# Patient Record
Sex: FEMALE | Race: ASIAN | NOT HISPANIC OR LATINO
[De-identification: names, ages, dates, MRNs, and addresses within clinical notes are randomized per-mention and may not be internally consistent; named-entity substitution may affect disease eponyms.]

---

## 2024-06-06 ENCOUNTER — APPOINTMENT (OUTPATIENT)
Dept: OBGYN | Facility: CLINIC | Age: 34
End: 2024-06-06

## 2024-06-06 PROBLEM — Z00.00 ENCOUNTER FOR PREVENTIVE HEALTH EXAMINATION: Status: ACTIVE | Noted: 2024-06-06

## 2024-06-10 ENCOUNTER — APPOINTMENT (OUTPATIENT)
Dept: OBGYN | Facility: CLINIC | Age: 34
End: 2024-06-10

## 2024-06-13 ENCOUNTER — LABORATORY RESULT (OUTPATIENT)
Age: 34
End: 2024-06-13

## 2024-06-13 ENCOUNTER — APPOINTMENT (OUTPATIENT)
Dept: OBGYN | Facility: CLINIC | Age: 34
End: 2024-06-13

## 2024-06-13 ENCOUNTER — NON-APPOINTMENT (OUTPATIENT)
Age: 34
End: 2024-06-13

## 2024-06-13 ENCOUNTER — OUTPATIENT (OUTPATIENT)
Dept: OUTPATIENT SERVICES | Facility: HOSPITAL | Age: 34
LOS: 1 days | End: 2024-06-13

## 2024-06-13 ENCOUNTER — OUTPATIENT (OUTPATIENT)
Dept: OUTPATIENT SERVICES | Facility: HOSPITAL | Age: 34
LOS: 1 days | End: 2024-06-13
Payer: MEDICAID

## 2024-06-13 ENCOUNTER — APPOINTMENT (OUTPATIENT)
Dept: OBGYN | Facility: CLINIC | Age: 34
End: 2024-06-13
Payer: MEDICAID

## 2024-06-13 VITALS
SYSTOLIC BLOOD PRESSURE: 125 MMHG | WEIGHT: 184.44 LBS | HEIGHT: 64 IN | BODY MASS INDEX: 31.49 KG/M2 | DIASTOLIC BLOOD PRESSURE: 77 MMHG

## 2024-06-13 DIAGNOSIS — Z87.42 PERSONAL HISTORY OF OTHER DISEASES OF THE FEMALE GENITAL TRACT: ICD-10-CM

## 2024-06-13 DIAGNOSIS — Z31.83 ENCOUNTER FOR ASSISTED REPRODUCTIVE FERTILITY PROCEDURE CYCLE: ICD-10-CM

## 2024-06-13 DIAGNOSIS — O09.519 SUPERVISION OF ELDERLY PRIMIGRAVIDA, UNSPECIFIED TRIMESTER: ICD-10-CM

## 2024-06-13 LAB
ABO + RH PNL BLD: NORMAL
BASOPHILS # BLD AUTO: 0.05 K/UL
BASOPHILS NFR BLD AUTO: 0.5 %
BILIRUB UR QL STRIP: NORMAL
BLD GP AB SCN SERPL QL: NORMAL
CLARITY UR: CLEAR
COLLECTION METHOD: NORMAL
EOSINOPHIL # BLD AUTO: 0.11 K/UL
EOSINOPHIL NFR BLD AUTO: 1.1 %
ESTIMATED AVERAGE GLUCOSE: 111 MG/DL
GLUCOSE UR-MCNC: NORMAL
HBA1C MFR BLD HPLC: 5.5 %
HCG UR QL: 0.2 EU/DL
HCT VFR BLD CALC: 34.9 %
HGB BLD-MCNC: 12 G/DL
HGB UR QL STRIP.AUTO: NORMAL
IMM GRANULOCYTES NFR BLD AUTO: 0.8 %
KETONES UR-MCNC: NORMAL
LEUKOCYTE ESTERASE UR QL STRIP: NORMAL
LYMPHOCYTES # BLD AUTO: 2.44 K/UL
LYMPHOCYTES NFR BLD AUTO: 24.6 %
MAN DIFF?: NORMAL
MCHC RBC-ENTMCNC: 27.8 PG
MCHC RBC-ENTMCNC: 34.4 G/DL
MCV RBC AUTO: 81 FL
MONOCYTES # BLD AUTO: 0.49 K/UL
MONOCYTES NFR BLD AUTO: 4.9 %
NEUTROPHILS # BLD AUTO: 6.73 K/UL
NEUTROPHILS NFR BLD AUTO: 68.1 %
NITRITE UR QL STRIP: NORMAL
PH UR STRIP: 6
PLATELET # BLD AUTO: 276 K/UL
PMV BLD AUTO: 0 /100 WBCS
PROT UR STRIP-MCNC: NORMAL
RBC # BLD: 4.31 M/UL
RBC # FLD: 13.7 %
SP GR UR STRIP: 1.02
WBC # FLD AUTO: 9.9 K/UL

## 2024-06-13 PROCEDURE — 83020 HEMOGLOBIN ELECTROPHORESIS: CPT | Mod: 26

## 2024-06-13 PROCEDURE — 99204 OFFICE O/P NEW MOD 45 MIN: CPT

## 2024-06-14 ENCOUNTER — NON-APPOINTMENT (OUTPATIENT)
Age: 34
End: 2024-06-14

## 2024-06-14 DIAGNOSIS — Z31.83 ENCOUNTER FOR ASSISTED REPRODUCTIVE FERTILITY PROCEDURE CYCLE: ICD-10-CM

## 2024-06-14 LAB
C TRACH RRNA SPEC QL NAA+PROBE: NOT DETECTED
HBV SURFACE AG SER QL: NONREACTIVE
HCV AB SER QL: NONREACTIVE
HCV S/CO RATIO: 0.11 S/CO
HGB A MFR BLD: 97.7 %
HGB A2 MFR BLD: 2.3 %
HGB FRACT BLD-IMP: NORMAL
HIV1+2 AB SPEC QL IA.RAPID: NONREACTIVE
MEV IGG FLD QL IA: 167 AU/ML
MEV IGG+IGM SER-IMP: POSITIVE
N GONORRHOEA RRNA SPEC QL NAA+PROBE: NOT DETECTED
RUBV IGG FLD-ACNC: 8.07 INDEX
RUBV IGG SER-IMP: POSITIVE
SOURCE AMPLIFICATION: NORMAL

## 2024-06-15 LAB
BACTERIA UR CULT: ABNORMAL
LEAD BLD-MCNC: <1 UG/DL
M TB IFN-G BLD-IMP: NEGATIVE
QUANTIFERON TB PLUS MITOGEN MINUS NIL: 1.44 IU/ML
QUANTIFERON TB PLUS NIL: 0.02 IU/ML
QUANTIFERON TB PLUS TB1 MINUS NIL: 0 IU/ML
QUANTIFERON TB PLUS TB2 MINUS NIL: 0.01 IU/ML
SOURCE AMPLIFICATION: NORMAL
T VAGINALIS RRNA SPEC QL NAA+PROBE: NOT DETECTED

## 2024-06-17 ENCOUNTER — ASOB RESULT (OUTPATIENT)
Age: 34
End: 2024-06-17

## 2024-06-17 ENCOUNTER — APPOINTMENT (OUTPATIENT)
Dept: ANTEPARTUM | Facility: CLINIC | Age: 34
End: 2024-06-17

## 2024-06-17 ENCOUNTER — OUTPATIENT (OUTPATIENT)
Dept: OUTPATIENT SERVICES | Facility: HOSPITAL | Age: 34
LOS: 1 days | End: 2024-06-17
Payer: MEDICAID

## 2024-06-17 ENCOUNTER — APPOINTMENT (OUTPATIENT)
Dept: ANTEPARTUM | Facility: CLINIC | Age: 34
End: 2024-06-17
Payer: MEDICAID

## 2024-06-17 DIAGNOSIS — Z3A.34 34 WEEKS GESTATION OF PREGNANCY: ICD-10-CM

## 2024-06-17 DIAGNOSIS — Z36.3 ENCOUNTER FOR ANTENATAL SCREENING FOR MALFORMATIONS: ICD-10-CM

## 2024-06-17 DIAGNOSIS — O26.842 UTERINE SIZE-DATE DISCREPANCY, SECOND TRIMESTER: ICD-10-CM

## 2024-06-17 DIAGNOSIS — Z34.90 ENCOUNTER FOR SUPERVISION OF NORMAL PREGNANCY, UNSPECIFIED, UNSPECIFIED TRIMESTER: ICD-10-CM

## 2024-06-17 DIAGNOSIS — O34.13 MATERNAL CARE FOR BENIGN TUMOR OF CORPUS UTERI, THIRD TRIMESTER: ICD-10-CM

## 2024-06-17 DIAGNOSIS — O09.813 SUPERVISION OF PREGNANCY RESULTING FROM ASSISTED REPRODUCTIVE TECHNOLOGY, THIRD TRIMESTER: ICD-10-CM

## 2024-06-17 DIAGNOSIS — O09.33 SUPERVISION OF PREGNANCY WITH INSUFFICIENT ANTENATAL CARE, THIRD TRIMESTER: ICD-10-CM

## 2024-06-17 PROCEDURE — 76805 OB US >/= 14 WKS SNGL FETUS: CPT | Mod: 26

## 2024-06-17 PROCEDURE — 76819 FETAL BIOPHYS PROFIL W/O NST: CPT | Mod: 26

## 2024-06-17 PROCEDURE — 76817 TRANSVAGINAL US OBSTETRIC: CPT

## 2024-06-17 PROCEDURE — 76817 TRANSVAGINAL US OBSTETRIC: CPT | Mod: 26

## 2024-06-17 PROCEDURE — 76819 FETAL BIOPHYS PROFIL W/O NST: CPT

## 2024-06-17 PROCEDURE — 76811 OB US DETAILED SNGL FETUS: CPT

## 2024-06-18 LAB — AR GENE MUT ANL BLD/T: NORMAL

## 2024-06-19 LAB — CFTR MUT TESTED BLD/T: NEGATIVE

## 2024-06-20 ENCOUNTER — APPOINTMENT (OUTPATIENT)
Dept: OBGYN | Facility: CLINIC | Age: 34
End: 2024-06-20
Payer: MEDICAID

## 2024-06-20 ENCOUNTER — TRANSCRIPTION ENCOUNTER (OUTPATIENT)
Age: 34
End: 2024-06-20

## 2024-06-20 ENCOUNTER — OUTPATIENT (OUTPATIENT)
Dept: OUTPATIENT SERVICES | Facility: HOSPITAL | Age: 34
LOS: 1 days | End: 2024-06-20
Payer: MEDICAID

## 2024-06-20 VITALS
WEIGHT: 182 LBS | OXYGEN SATURATION: 98 % | DIASTOLIC BLOOD PRESSURE: 78 MMHG | BODY MASS INDEX: 31.07 KG/M2 | HEIGHT: 64 IN | SYSTOLIC BLOOD PRESSURE: 121 MMHG | HEART RATE: 66 BPM

## 2024-06-20 DIAGNOSIS — Z34.93 ENCOUNTER FOR SUPERVISION OF NORMAL PREGNANCY, UNSPECIFIED, THIRD TRIMESTER: ICD-10-CM

## 2024-06-20 DIAGNOSIS — Z34.90 ENCOUNTER FOR SUPERVISION OF NORMAL PREGNANCY, UNSPECIFIED, UNSPECIFIED TRIMESTER: ICD-10-CM

## 2024-06-20 LAB
BILIRUB UR QL STRIP: NORMAL
CLARITY UR: CLEAR
COLLECTION METHOD: NORMAL
GLUCOSE UR-MCNC: NORMAL
HCG UR QL: NORMAL EU/DL
HGB UR QL STRIP.AUTO: NORMAL
KETONES UR-MCNC: NORMAL
LEUKOCYTE ESTERASE UR QL STRIP: NORMAL
NITRITE UR QL STRIP: NORMAL
PH UR STRIP: NORMAL
PROT UR STRIP-MCNC: NORMAL
SP GR UR STRIP: NORMAL

## 2024-06-20 PROCEDURE — 99213 OFFICE O/P EST LOW 20 MIN: CPT

## 2024-06-20 PROCEDURE — 81002 URINALYSIS NONAUTO W/O SCOPE: CPT

## 2024-06-20 PROCEDURE — 99203 OFFICE O/P NEW LOW 30 MIN: CPT

## 2024-06-21 ENCOUNTER — APPOINTMENT (OUTPATIENT)
Dept: OBGYN | Facility: CLINIC | Age: 34
End: 2024-06-21

## 2024-06-21 ENCOUNTER — NON-APPOINTMENT (OUTPATIENT)
Age: 34
End: 2024-06-21

## 2024-06-21 DIAGNOSIS — Z34.93 ENCOUNTER FOR SUPERVISION OF NORMAL PREGNANCY, UNSPECIFIED, THIRD TRIMESTER: ICD-10-CM

## 2024-06-21 DIAGNOSIS — Z00.00 ENCOUNTER FOR GENERAL ADULT MEDICAL EXAMINATION WITHOUT ABNORMAL FINDINGS: ICD-10-CM

## 2024-06-21 LAB — FMR1 GENE MUT ANL BLD/T: NORMAL

## 2024-06-24 ENCOUNTER — ASOB RESULT (OUTPATIENT)
Age: 34
End: 2024-06-24

## 2024-06-24 ENCOUNTER — OUTPATIENT (OUTPATIENT)
Dept: OUTPATIENT SERVICES | Facility: HOSPITAL | Age: 34
LOS: 1 days | End: 2024-06-24
Payer: MEDICAID

## 2024-06-24 ENCOUNTER — APPOINTMENT (OUTPATIENT)
Dept: ANTEPARTUM | Facility: CLINIC | Age: 34
End: 2024-06-24
Payer: MEDICAID

## 2024-06-24 DIAGNOSIS — Z34.90 ENCOUNTER FOR SUPERVISION OF NORMAL PREGNANCY, UNSPECIFIED, UNSPECIFIED TRIMESTER: ICD-10-CM

## 2024-06-24 PROCEDURE — 76819 FETAL BIOPHYS PROFIL W/O NST: CPT | Mod: 26

## 2024-06-24 PROCEDURE — 76819 FETAL BIOPHYS PROFIL W/O NST: CPT

## 2024-06-25 DIAGNOSIS — O34.13 MATERNAL CARE FOR BENIGN TUMOR OF CORPUS UTERI, THIRD TRIMESTER: ICD-10-CM

## 2024-06-25 DIAGNOSIS — O09.33 SUPERVISION OF PREGNANCY WITH INSUFFICIENT ANTENATAL CARE, THIRD TRIMESTER: ICD-10-CM

## 2024-06-25 DIAGNOSIS — Z3A.35 35 WEEKS GESTATION OF PREGNANCY: ICD-10-CM

## 2024-07-01 ENCOUNTER — APPOINTMENT (OUTPATIENT)
Dept: ANTEPARTUM | Facility: CLINIC | Age: 34
End: 2024-07-01
Payer: MEDICAID

## 2024-07-01 ENCOUNTER — ASOB RESULT (OUTPATIENT)
Age: 34
End: 2024-07-01

## 2024-07-01 ENCOUNTER — OUTPATIENT (OUTPATIENT)
Dept: OUTPATIENT SERVICES | Facility: HOSPITAL | Age: 34
LOS: 1 days | End: 2024-07-01
Payer: MEDICAID

## 2024-07-01 DIAGNOSIS — Z34.90 ENCOUNTER FOR SUPERVISION OF NORMAL PREGNANCY, UNSPECIFIED, UNSPECIFIED TRIMESTER: ICD-10-CM

## 2024-07-01 PROCEDURE — 76819 FETAL BIOPHYS PROFIL W/O NST: CPT | Mod: 26

## 2024-07-01 PROCEDURE — 76819 FETAL BIOPHYS PROFIL W/O NST: CPT

## 2024-07-02 DIAGNOSIS — O09.813 SUPERVISION OF PREGNANCY RESULTING FROM ASSISTED REPRODUCTIVE TECHNOLOGY, THIRD TRIMESTER: ICD-10-CM

## 2024-07-02 DIAGNOSIS — Z3A.36 36 WEEKS GESTATION OF PREGNANCY: ICD-10-CM

## 2024-07-02 DIAGNOSIS — O34.13 MATERNAL CARE FOR BENIGN TUMOR OF CORPUS UTERI, THIRD TRIMESTER: ICD-10-CM

## 2024-07-02 DIAGNOSIS — O09.33 SUPERVISION OF PREGNANCY WITH INSUFFICIENT ANTENATAL CARE, THIRD TRIMESTER: ICD-10-CM

## 2024-07-03 ENCOUNTER — OUTPATIENT (OUTPATIENT)
Dept: INPATIENT UNIT | Facility: HOSPITAL | Age: 34
LOS: 1 days | Discharge: HOME | End: 2024-07-03
Payer: MEDICAID

## 2024-07-03 ENCOUNTER — APPOINTMENT (OUTPATIENT)
Dept: OBGYN | Facility: CLINIC | Age: 34
End: 2024-07-03
Payer: MEDICAID

## 2024-07-03 ENCOUNTER — OUTPATIENT (OUTPATIENT)
Dept: OUTPATIENT SERVICES | Facility: HOSPITAL | Age: 34
LOS: 1 days | End: 2024-07-03
Payer: MEDICAID

## 2024-07-03 VITALS
DIASTOLIC BLOOD PRESSURE: 56 MMHG | TEMPERATURE: 98 F | SYSTOLIC BLOOD PRESSURE: 120 MMHG | RESPIRATION RATE: 16 BRPM | HEART RATE: 57 BPM

## 2024-07-03 VITALS — DIASTOLIC BLOOD PRESSURE: 56 MMHG | SYSTOLIC BLOOD PRESSURE: 120 MMHG | HEART RATE: 57 BPM

## 2024-07-03 VITALS — SYSTOLIC BLOOD PRESSURE: 123 MMHG | DIASTOLIC BLOOD PRESSURE: 87 MMHG | WEIGHT: 189 LBS | BODY MASS INDEX: 32.44 KG/M2

## 2024-07-03 DIAGNOSIS — Z98.890 OTHER SPECIFIED POSTPROCEDURAL STATES: Chronic | ICD-10-CM

## 2024-07-03 DIAGNOSIS — Z98.891 HISTORY OF UTERINE SCAR FROM PREVIOUS SURGERY: Chronic | ICD-10-CM

## 2024-07-03 DIAGNOSIS — Z34.90 ENCOUNTER FOR SUPERVISION OF NORMAL PREGNANCY, UNSPECIFIED, UNSPECIFIED TRIMESTER: ICD-10-CM

## 2024-07-03 DIAGNOSIS — O26.899 OTHER SPECIFIED PREGNANCY RELATED CONDITIONS, UNSPECIFIED TRIMESTER: ICD-10-CM

## 2024-07-03 PROCEDURE — 87491 CHLMYD TRACH DNA AMP PROBE: CPT

## 2024-07-03 PROCEDURE — 87653 STREP B DNA AMP PROBE: CPT

## 2024-07-03 PROCEDURE — 99214 OFFICE O/P EST MOD 30 MIN: CPT

## 2024-07-03 PROCEDURE — 81002 URINALYSIS NONAUTO W/O SCOPE: CPT

## 2024-07-03 PROCEDURE — 87661 TRICHOMONAS VAGINALIS AMPLIF: CPT

## 2024-07-03 PROCEDURE — 99213 OFFICE O/P EST LOW 20 MIN: CPT

## 2024-07-03 PROCEDURE — 87591 N.GONORRHOEAE DNA AMP PROB: CPT

## 2024-07-05 DIAGNOSIS — O09.813 SUPERVISION OF PREGNANCY RESULTING FROM ASSISTED REPRODUCTIVE TECHNOLOGY, THIRD TRIMESTER: ICD-10-CM

## 2024-07-05 DIAGNOSIS — O99.891 OTHER SPECIFIED DISEASES AND CONDITIONS COMPLICATING PREGNANCY: ICD-10-CM

## 2024-07-05 DIAGNOSIS — O36.8130 DECREASED FETAL MOVEMENTS, THIRD TRIMESTER, NOT APPLICABLE OR UNSPECIFIED: ICD-10-CM

## 2024-07-05 DIAGNOSIS — D21.9 BENIGN NEOPLASM OF CONNECTIVE AND OTHER SOFT TISSUE, UNSPECIFIED: ICD-10-CM

## 2024-07-05 DIAGNOSIS — Z3A.36 36 WEEKS GESTATION OF PREGNANCY: ICD-10-CM

## 2024-07-05 DIAGNOSIS — Z34.90 ENCOUNTER FOR SUPERVISION OF NORMAL PREGNANCY, UNSPECIFIED, UNSPECIFIED TRIMESTER: ICD-10-CM

## 2024-07-05 LAB
SOURCE AMPLIFICATION: NORMAL
T VAGINALIS RRNA SPEC QL NAA+PROBE: NOT DETECTED

## 2024-07-06 LAB
C TRACH RRNA SPEC QL NAA+PROBE: NOT DETECTED
GP B STREP DNA SPEC QL NAA+PROBE: NOT DETECTED
N GONORRHOEA RRNA SPEC QL NAA+PROBE: NOT DETECTED
SOURCE AMPLIFICATION: NORMAL
SOURCE GBS: NORMAL

## 2024-07-08 ENCOUNTER — APPOINTMENT (OUTPATIENT)
Dept: ANTEPARTUM | Facility: CLINIC | Age: 34
End: 2024-07-08
Payer: MEDICAID

## 2024-07-08 ENCOUNTER — ASOB RESULT (OUTPATIENT)
Age: 34
End: 2024-07-08

## 2024-07-08 ENCOUNTER — OUTPATIENT (OUTPATIENT)
Dept: OUTPATIENT SERVICES | Facility: HOSPITAL | Age: 34
LOS: 1 days | End: 2024-07-08
Payer: MEDICAID

## 2024-07-08 DIAGNOSIS — Z98.891 HISTORY OF UTERINE SCAR FROM PREVIOUS SURGERY: Chronic | ICD-10-CM

## 2024-07-08 DIAGNOSIS — Z34.90 ENCOUNTER FOR SUPERVISION OF NORMAL PREGNANCY, UNSPECIFIED, UNSPECIFIED TRIMESTER: ICD-10-CM

## 2024-07-08 DIAGNOSIS — Z98.890 OTHER SPECIFIED POSTPROCEDURAL STATES: Chronic | ICD-10-CM

## 2024-07-08 PROCEDURE — 76816 OB US FOLLOW-UP PER FETUS: CPT | Mod: 26

## 2024-07-08 PROCEDURE — 76816 OB US FOLLOW-UP PER FETUS: CPT

## 2024-07-08 PROCEDURE — 76819 FETAL BIOPHYS PROFIL W/O NST: CPT | Mod: 26

## 2024-07-08 PROCEDURE — 76819 FETAL BIOPHYS PROFIL W/O NST: CPT

## 2024-07-10 ENCOUNTER — APPOINTMENT (OUTPATIENT)
Dept: OBGYN | Facility: CLINIC | Age: 34
End: 2024-07-10
Payer: MEDICAID

## 2024-07-10 ENCOUNTER — OUTPATIENT (OUTPATIENT)
Dept: OUTPATIENT SERVICES | Facility: HOSPITAL | Age: 34
LOS: 1 days | End: 2024-07-10
Payer: MEDICAID

## 2024-07-10 VITALS
SYSTOLIC BLOOD PRESSURE: 123 MMHG | HEIGHT: 64 IN | BODY MASS INDEX: 32.78 KG/M2 | WEIGHT: 192 LBS | DIASTOLIC BLOOD PRESSURE: 84 MMHG

## 2024-07-10 DIAGNOSIS — Z98.890 OTHER SPECIFIED POSTPROCEDURAL STATES: Chronic | ICD-10-CM

## 2024-07-10 DIAGNOSIS — Z34.90 ENCOUNTER FOR SUPERVISION OF NORMAL PREGNANCY, UNSPECIFIED, UNSPECIFIED TRIMESTER: ICD-10-CM

## 2024-07-10 DIAGNOSIS — Z3A.37 37 WEEKS GESTATION OF PREGNANCY: ICD-10-CM

## 2024-07-10 DIAGNOSIS — O34.13 MATERNAL CARE FOR BENIGN TUMOR OF CORPUS UTERI, THIRD TRIMESTER: ICD-10-CM

## 2024-07-10 DIAGNOSIS — Z98.891 HISTORY OF UTERINE SCAR FROM PREVIOUS SURGERY: Chronic | ICD-10-CM

## 2024-07-10 DIAGNOSIS — O09.33 SUPERVISION OF PREGNANCY WITH INSUFFICIENT ANTENATAL CARE, THIRD TRIMESTER: ICD-10-CM

## 2024-07-10 DIAGNOSIS — O09.813 SUPERVISION OF PREGNANCY RESULTING FROM ASSISTED REPRODUCTIVE TECHNOLOGY, THIRD TRIMESTER: ICD-10-CM

## 2024-07-10 LAB
BILIRUB UR QL STRIP: NEGATIVE
CLARITY UR: CLEAR
COLLECTION METHOD: NORMAL
GLUCOSE UR-MCNC: NEGATIVE
HCG UR QL: 0.2 EU/DL
HGB UR QL STRIP.AUTO: NEGATIVE
KETONES UR-MCNC: NEGATIVE
LEUKOCYTE ESTERASE UR QL STRIP: NEGATIVE
NITRITE UR QL STRIP: NEGATIVE
PH UR STRIP: 6
PROT UR STRIP-MCNC: NEGATIVE
SP GR UR STRIP: 1

## 2024-07-10 PROCEDURE — 99213 OFFICE O/P EST LOW 20 MIN: CPT

## 2024-07-10 PROCEDURE — 81002 URINALYSIS NONAUTO W/O SCOPE: CPT

## 2024-07-12 ENCOUNTER — INPATIENT (INPATIENT)
Facility: HOSPITAL | Age: 34
LOS: 4 days | Discharge: HOME | DRG: 560 | End: 2024-07-17
Attending: STUDENT IN AN ORGANIZED HEALTH CARE EDUCATION/TRAINING PROGRAM | Admitting: STUDENT IN AN ORGANIZED HEALTH CARE EDUCATION/TRAINING PROGRAM
Payer: MEDICAID

## 2024-07-12 VITALS — SYSTOLIC BLOOD PRESSURE: 129 MMHG | HEART RATE: 120 BPM | DIASTOLIC BLOOD PRESSURE: 92 MMHG

## 2024-07-12 DIAGNOSIS — Z98.891 HISTORY OF UTERINE SCAR FROM PREVIOUS SURGERY: Chronic | ICD-10-CM

## 2024-07-12 DIAGNOSIS — Z98.890 OTHER SPECIFIED POSTPROCEDURAL STATES: Chronic | ICD-10-CM

## 2024-07-12 DIAGNOSIS — Z34.90 ENCOUNTER FOR SUPERVISION OF NORMAL PREGNANCY, UNSPECIFIED, UNSPECIFIED TRIMESTER: ICD-10-CM

## 2024-07-12 DIAGNOSIS — O61.0 FAILED MEDICAL INDUCTION OF LABOR: ICD-10-CM

## 2024-07-12 DIAGNOSIS — K51.90 ULCERATIVE COLITIS, UNSPECIFIED, WITHOUT COMPLICATIONS: ICD-10-CM

## 2024-07-12 DIAGNOSIS — O26.899 OTHER SPECIFIED PREGNANCY RELATED CONDITIONS, UNSPECIFIED TRIMESTER: ICD-10-CM

## 2024-07-12 LAB
ALBUMIN SERPL ELPH-MCNC: 3.4 G/DL — LOW (ref 3.5–5.2)
ALP SERPL-CCNC: 153 U/L — HIGH (ref 30–115)
ALT FLD-CCNC: 10 U/L — SIGNIFICANT CHANGE UP (ref 0–41)
AMPHET UR-MCNC: NEGATIVE — SIGNIFICANT CHANGE UP
ANION GAP SERPL CALC-SCNC: 11 MMOL/L — SIGNIFICANT CHANGE UP (ref 7–14)
APPEARANCE UR: CLEAR — SIGNIFICANT CHANGE UP
AST SERPL-CCNC: 16 U/L — SIGNIFICANT CHANGE UP (ref 0–41)
BARBITURATES UR SCN-MCNC: NEGATIVE — SIGNIFICANT CHANGE UP
BASOPHILS # BLD AUTO: 0.05 K/UL — SIGNIFICANT CHANGE UP (ref 0–0.2)
BASOPHILS NFR BLD AUTO: 0.4 % — SIGNIFICANT CHANGE UP (ref 0–1)
BENZODIAZ UR-MCNC: NEGATIVE — SIGNIFICANT CHANGE UP
BILIRUB SERPL-MCNC: <0.2 MG/DL — SIGNIFICANT CHANGE UP (ref 0.2–1.2)
BILIRUB UR-MCNC: NEGATIVE — SIGNIFICANT CHANGE UP
BLD GP AB SCN SERPL QL: SIGNIFICANT CHANGE UP
BUN SERPL-MCNC: 12 MG/DL — SIGNIFICANT CHANGE UP (ref 10–20)
BUPRENORPHINE SCREEN, URINE RESULT: NEGATIVE — SIGNIFICANT CHANGE UP
CALCIUM SERPL-MCNC: 9.5 MG/DL — SIGNIFICANT CHANGE UP (ref 8.4–10.5)
CHLORIDE SERPL-SCNC: 103 MMOL/L — SIGNIFICANT CHANGE UP (ref 98–110)
CO2 SERPL-SCNC: 17 MMOL/L — SIGNIFICANT CHANGE UP (ref 17–32)
COCAINE METAB.OTHER UR-MCNC: NEGATIVE — SIGNIFICANT CHANGE UP
COLOR SPEC: YELLOW — SIGNIFICANT CHANGE UP
CREAT ?TM UR-MCNC: 49 MG/DL — SIGNIFICANT CHANGE UP
CREAT SERPL-MCNC: 0.5 MG/DL — LOW (ref 0.7–1.5)
DIFF PNL FLD: ABNORMAL
EGFR: 126 ML/MIN/1.73M2 — SIGNIFICANT CHANGE UP
EOSINOPHIL # BLD AUTO: 0.1 K/UL — SIGNIFICANT CHANGE UP (ref 0–0.7)
EOSINOPHIL NFR BLD AUTO: 0.9 % — SIGNIFICANT CHANGE UP (ref 0–8)
FENTANYL UR QL: NEGATIVE — SIGNIFICANT CHANGE UP
GLUCOSE SERPL-MCNC: 106 MG/DL — HIGH (ref 70–99)
GLUCOSE UR QL: NEGATIVE MG/DL — SIGNIFICANT CHANGE UP
HCT VFR BLD CALC: 37.6 % — SIGNIFICANT CHANGE UP (ref 37–47)
HGB BLD-MCNC: 13.1 G/DL — SIGNIFICANT CHANGE UP (ref 12–16)
IMM GRANULOCYTES NFR BLD AUTO: 0.6 % — HIGH (ref 0.1–0.3)
KETONES UR-MCNC: NEGATIVE MG/DL — SIGNIFICANT CHANGE UP
L&D DRUG SCREEN, URINE: SIGNIFICANT CHANGE UP
LDH SERPL L TO P-CCNC: 178 — SIGNIFICANT CHANGE UP (ref 50–242)
LEUKOCYTE ESTERASE UR-ACNC: ABNORMAL
LYMPHOCYTES # BLD AUTO: 2.95 K/UL — SIGNIFICANT CHANGE UP (ref 1.2–3.4)
LYMPHOCYTES # BLD AUTO: 26.4 % — SIGNIFICANT CHANGE UP (ref 20.5–51.1)
MCHC RBC-ENTMCNC: 27.8 PG — SIGNIFICANT CHANGE UP (ref 27–31)
MCHC RBC-ENTMCNC: 34.8 G/DL — SIGNIFICANT CHANGE UP (ref 32–37)
MCV RBC AUTO: 79.8 FL — LOW (ref 81–99)
METHADONE UR-MCNC: NEGATIVE — SIGNIFICANT CHANGE UP
MONOCYTES # BLD AUTO: 0.63 K/UL — HIGH (ref 0.1–0.6)
MONOCYTES NFR BLD AUTO: 5.6 % — SIGNIFICANT CHANGE UP (ref 1.7–9.3)
NEUTROPHILS # BLD AUTO: 7.37 K/UL — HIGH (ref 1.4–6.5)
NEUTROPHILS NFR BLD AUTO: 66.1 % — SIGNIFICANT CHANGE UP (ref 42.2–75.2)
NITRITE UR-MCNC: NEGATIVE — SIGNIFICANT CHANGE UP
NRBC # BLD: 0 /100 WBCS — SIGNIFICANT CHANGE UP (ref 0–0)
OPIATES UR-MCNC: NEGATIVE — SIGNIFICANT CHANGE UP
OXYCODONE UR-MCNC: NEGATIVE — SIGNIFICANT CHANGE UP
PCP UR-MCNC: NEGATIVE — SIGNIFICANT CHANGE UP
PH UR: 6.5 — SIGNIFICANT CHANGE UP (ref 5–8)
PLATELET # BLD AUTO: 267 K/UL — SIGNIFICANT CHANGE UP (ref 130–400)
PMV BLD: 10.5 FL — HIGH (ref 7.4–10.4)
POTASSIUM SERPL-MCNC: 4.1 MMOL/L — SIGNIFICANT CHANGE UP (ref 3.5–5)
POTASSIUM SERPL-SCNC: 4.1 MMOL/L — SIGNIFICANT CHANGE UP (ref 3.5–5)
PRENATAL SYPHILIS TEST: SIGNIFICANT CHANGE UP
PROPOXYPHENE QUALITATIVE URINE RESULT: NEGATIVE — SIGNIFICANT CHANGE UP
PROT ?TM UR-MCNC: 11 MG/DLG/24H — SIGNIFICANT CHANGE UP
PROT SERPL-MCNC: 6.3 G/DL — SIGNIFICANT CHANGE UP (ref 6–8)
PROT UR-MCNC: NEGATIVE MG/DL — SIGNIFICANT CHANGE UP
PROT/CREAT UR-RTO: 0.2 RATIO — SIGNIFICANT CHANGE UP (ref 0–0.2)
RAPID RVP RESULT: SIGNIFICANT CHANGE UP
RBC # BLD: 4.71 M/UL — SIGNIFICANT CHANGE UP (ref 4.2–5.4)
RBC # FLD: 14 % — SIGNIFICANT CHANGE UP (ref 11.5–14.5)
SARS-COV-2 RNA SPEC QL NAA+PROBE: SIGNIFICANT CHANGE UP
SODIUM SERPL-SCNC: 131 MMOL/L — LOW (ref 135–146)
SP GR SPEC: 1.01 — SIGNIFICANT CHANGE UP (ref 1–1.03)
URATE SERPL-MCNC: 7.3 MG/DL — HIGH (ref 2.5–7)
UROBILINOGEN FLD QL: 0.2 MG/DL — SIGNIFICANT CHANGE UP (ref 0.2–1)
WBC # BLD: 11.17 K/UL — HIGH (ref 4.8–10.8)
WBC # FLD AUTO: 11.17 K/UL — HIGH (ref 4.8–10.8)

## 2024-07-12 PROCEDURE — 82570 ASSAY OF URINE CREATININE: CPT

## 2024-07-12 PROCEDURE — 87086 URINE CULTURE/COLONY COUNT: CPT

## 2024-07-12 PROCEDURE — 36415 COLL VENOUS BLD VENIPUNCTURE: CPT

## 2024-07-12 PROCEDURE — 80354 DRUG SCREENING FENTANYL: CPT

## 2024-07-12 PROCEDURE — 80307 DRUG TEST PRSMV CHEM ANLYZR: CPT

## 2024-07-12 PROCEDURE — 87186 SC STD MICRODIL/AGAR DIL: CPT

## 2024-07-12 PROCEDURE — 86592 SYPHILIS TEST NON-TREP QUAL: CPT

## 2024-07-12 PROCEDURE — 85025 COMPLETE CBC W/AUTO DIFF WBC: CPT

## 2024-07-12 PROCEDURE — 59050 FETAL MONITOR W/REPORT: CPT

## 2024-07-12 PROCEDURE — 87077 CULTURE AEROBIC IDENTIFY: CPT

## 2024-07-12 PROCEDURE — 86901 BLOOD TYPING SEROLOGIC RH(D): CPT

## 2024-07-12 PROCEDURE — 84156 ASSAY OF PROTEIN URINE: CPT

## 2024-07-12 PROCEDURE — 86850 RBC ANTIBODY SCREEN: CPT

## 2024-07-12 PROCEDURE — 86900 BLOOD TYPING SEROLOGIC ABO: CPT

## 2024-07-12 PROCEDURE — 81001 URINALYSIS AUTO W/SCOPE: CPT

## 2024-07-12 RX ORDER — OXYTOCIN 30 [USP'U]/500ML
2 INJECTION, SOLUTION INTRAVENOUS
Qty: 30 | Refills: 0 | Status: DISCONTINUED | OUTPATIENT
Start: 2024-07-12 | End: 2024-07-13

## 2024-07-12 RX ORDER — DEXTROSE MONOHYDRATE AND SODIUM CHLORIDE 5; .3 G/100ML; G/100ML
1000 INJECTION, SOLUTION INTRAVENOUS
Refills: 0 | Status: DISCONTINUED | OUTPATIENT
Start: 2024-07-12 | End: 2024-07-13

## 2024-07-12 RX ORDER — DEXTROSE MONOHYDRATE AND SODIUM CHLORIDE 5; .3 G/100ML; G/100ML
1000 INJECTION, SOLUTION INTRAVENOUS
Refills: 0 | Status: DISCONTINUED | OUTPATIENT
Start: 2024-07-12 | End: 2024-07-12

## 2024-07-12 RX ORDER — ONDANSETRON HYDROCHLORIDE 2 MG/ML
4 INJECTION INTRAMUSCULAR; INTRAVENOUS ONCE
Refills: 0 | Status: COMPLETED | OUTPATIENT
Start: 2024-07-12 | End: 2024-07-12

## 2024-07-12 RX ORDER — OXYTOCIN 30 [USP'U]/500ML
333.33 INJECTION, SOLUTION INTRAVENOUS
Qty: 20 | Refills: 0 | Status: DISCONTINUED | OUTPATIENT
Start: 2024-07-12 | End: 2024-07-13

## 2024-07-12 RX ADMIN — OXYTOCIN 2 MILLIUNIT(S)/MIN: 30 INJECTION, SOLUTION INTRAVENOUS at 17:06

## 2024-07-12 RX ADMIN — DEXTROSE MONOHYDRATE AND SODIUM CHLORIDE 125 MILLILITER(S): 5; .3 INJECTION, SOLUTION INTRAVENOUS at 13:00

## 2024-07-12 RX ADMIN — ONDANSETRON HYDROCHLORIDE 4 MILLIGRAM(S): 2 INJECTION INTRAMUSCULAR; INTRAVENOUS at 13:20

## 2024-07-12 RX ADMIN — DEXTROSE MONOHYDRATE AND SODIUM CHLORIDE 125 MILLILITER(S): 5; .3 INJECTION, SOLUTION INTRAVENOUS at 17:06

## 2024-07-12 NOTE — OB PROVIDER H&P - NSLOWPPHRISK_OBGYN_A_OB
No previous uterine incision/[place] Pregnancy/Less than or equal to 4 previous vaginal births/No known bleeding disorder/No history of postpartum hemorrhage/No other PPH risks indicated

## 2024-07-12 NOTE — OB PROVIDER TRIAGE NOTE - NSHPLABSRESULTS_GEN_ALL_CORE
6/13/24  B POS/neg  rubella immune  HCV nr  HIV nr  CF neg  HBSag nr  Quantiferon gold neg      GBS neg 7/3/24    Sonograms  36w1d: 2.92cm fibroid, BPP 8/8, breech, MVP 4.71cm  34w1d breech, posterior left lateral placenta, MVP 3.83 cm, BPP 8/8, EFW 2324 gms 39%, Anterior intramural myoma 3.41 x 1.86 x 2.98 cm, anatomy incomplete, no major malformations noted; fetal echo recommended- pt declined

## 2024-07-12 NOTE — OB PROVIDER H&P - ATTENDING COMMENTS
37+5wga with IVF pregnancy, h/o uterine septum removal (hysteroscopic) in early labor with suspected gestational HTN vs preE  Patient was thoroughly counseled on risks of gestational htn/ preE to fetus and recommendation at this time is for IOL   IOL process discussed with patient and she was informed process may take up to 3 days and involves medication such as misoprostol, pitocin, cervidil. Other methods include AROM. Possibility and management of nonreassuring fetal status discussed (including resuscitative measures with repositioning, IV hydration, oxygen administration). Possibility of  delivery in event of persistent nonreassuring status remote from delivery or failed IOL discussed.  Risks of hemorrhage and infection requiring medication or blood transfusion discussed. Patient endorsed understanding. All questions were answered. Patient desires to proceed with induction.   Plan to start IOL with pitocin  f/u labs and vitals closely   agree with plan as above  XM 2 untis

## 2024-07-12 NOTE — OB PROVIDER H&P - NSHPPHYSICALEXAM_GEN_ALL_CORE
ICU Vital Signs Last 24 Hrs  T(C): 36.6 (12 Jul 2024 12:16), Max: 36.6 (12 Jul 2024 12:16)  T(F): 97.9 (12 Jul 2024 12:16), Max: 97.9 (12 Jul 2024 12:16)  HR: 90 (12 Jul 2024 12:53) (90 - 120)  BP: 125/77 (12 Jul 2024 12:53) (125/77 - 133/92)  RR: 18 (12 Jul 2024 12:16) (18 - 18)    O2 Parameters below as of 12 Jul 2024 12:16  Patient On (Oxygen Delivery Method): room air    Gen: NAD, sitting comfortably  Abd: Gravid, soft, NT, palpable ctx  SVE: 2/0/-3 ,vtx, intact  EFM: 140/mod/+accels  [place]: Q5-10m

## 2024-07-12 NOTE — OB PROVIDER TRIAGE NOTE - NSOBPROVIDERNOTE_OBGYN_ALL_OB_FT
33yo  @37wk5d GBS negative presenting to L&D with abdominal pain. R/O Labor. HA w/ N/V r/o PEC/gestational hypertension.    -Observe for next 4 hours. q15min BP check   - PEC labs, IV fluids, IV zofran   - Monitor vitals   - continuous EFM/toco   - clear liquid diet   - IV fluid hydration

## 2024-07-12 NOTE — OB PROVIDER H&P - ASSESSMENT
35yo  @37wk5d GBS negative presenting to L&D with abdominal pain. R/O Labor. HA w/ N/V r/o PEC/gestational hypertension.    -Observe for next 4 hours. q15min BP check   - PEC labs, IV fluids, IV zofran   - Monitor vitals   - continuous EFM/toco   - clear liquid diet   - IV fluid hydration    Addendum   - pt endorses abdominal pain is getting stronger feels tighter   - VE shows cervical change 3/30/-3 from 0/-3   - pt is amendable to augmentation of labor   - admit to L&D   - monitor EFM/toco   - recheck VE in 2-3 hours  33yo  @37wk5d  presenting to L&D in early labor, at term with suspected gestational HTN, r/o PEC for labor augmentation    r/o preE:  -q15min BP check   - PEC labs, IV fluids,   -IV zofran prn nausea  - Monitor vitals     Labor augmentation:   - continuous EFM/toco   - clear liquid diet   - IV fluid hydration  - anesthesia for pain control  -start pitocin    - XM 2 units

## 2024-07-12 NOTE — OB PROVIDER TRIAGE NOTE - NSICDXPASTSURGICALHX_GEN_ALL_CORE_FT
PAST SURGICAL HISTORY:  H/O ovarian cystectomy     Status post hysteroscopic resection of uterine septum

## 2024-07-12 NOTE — PROGRESS NOTE ADULT - ASSESSMENT
A/P:  34y  at 37w5d hx IVF and multiple uterine surgeries in labor.     -cont EFM/toco  -clear liquid diet, IVF  -pain management with epidural  -continue pitocin    Dr. Antunez and Dr. Reyes aware.  A/P:  34y  at 37w5d, GBS negative, gHTN, hx IVF and multiple uterine surgeries in labor.     -cont EFM/toco  -clear liquid diet, IVF  -pain management with epidural  -continue pitocin    Dr. Antunez and Dr. Ann aware.

## 2024-07-12 NOTE — OB PROVIDER TRIAGE NOTE - NSHPPHYSICALEXAM_GEN_ALL_CORE
ICU Vital Signs Last 24 Hrs  T(C): 36.6 (12 Jul 2024 12:16), Max: 36.6 (12 Jul 2024 12:16)  T(F): 97.9 (12 Jul 2024 12:16), Max: 97.9 (12 Jul 2024 12:16)  HR: 90 (12 Jul 2024 12:53) (90 - 120)  BP: 125/77 (12 Jul 2024 12:53) (125/77 - 133/92)  RR: 18 (12 Jul 2024 12:16) (18 - 18)    O2 Parameters below as of 12 Jul 2024 12:16  Patient On (Oxygen Delivery Method): room air    Gen: NAD, sitting comfortably  Abd: Gravid, soft, NT, palpable ctx  SVE: 2/0/-3 ,vtx, intact  EFM: 140/mod/+accels  [place]: Q5-10m

## 2024-07-12 NOTE — OB PROVIDER TRIAGE NOTE - HISTORY OF PRESENT ILLNESS
35yo  at 37w5d, ELDON 24 by 5 day frozen embryo transfer, presents to L&D due to increasing abdominal pain since last night, reports N/V, CP and SOB. Denies LOF or vaginal bleeding. Reports good fetal movement. Denies complications this pregnancy. GBS neg.  35yo  at by frozen embryo transfer, with ELDON 24. SHe is presenting due to sudden onset upper abdominal discomfort and tightening that began yesterday evening associated with nausea, vomiting, on and off headache , and body aches. Endorses frequent BM, but they are soft. Denies dizziness, SOB, chest pain, severe abdominal pain, urinary sxs, constipation, contractions, LOF, VB. Endorses good fetal movement. Pregnancy complicated by:    #h/o endometriosis, uterine septum  - endometriosis excised laparoscopically  - hysteroscopic uterine septum excision, operative note in outside medical records ([employer]), arvuate uterus    #IVF prgnancy  - low risk NIPT, declined fetal echo or amniocentesis    #IBD - UC  - well controlled, diagnosed at age 12, no flare ups, reports occasional bloody stools  - rheumatoid arthritis diagnosed in outisde records, negative rheumatoid facor and RASHEEDA per records  - no medications

## 2024-07-12 NOTE — OB PROVIDER H&P - HISTORY OF PRESENT ILLNESS
35yo  at by frozen embryo transfer, with ELDON 24. She is presenting due to sudden onset upper abdominal discomfort and tightening that began yesterday evening associated with nausea, vomiting, on and off headache , and body aches. Endorses frequent BM, but they are soft. Denies dizziness, SOB, chest pain, severe abdominal pain, urinary sxs, constipation, contractions, LOF, VB. Endorses good fetal movement. Pregnancy complicated by:    #h/o endometriosis, uterine septum  - endometriosis excised laparoscopically  - hysteroscopic uterine septum excision, operative note in outside medical records ([employer]), arvuate uterus    #IVF pregnancy  - low risk NIPT, declined fetal echo or amniocentesis    #IBD - UC  - well controlled, diagnosed at age 12, no flare ups, reports occasional bloody stools  - rheumatoid arthritis diagnosed in outside records, negative rheumatoid factor and RASHEEDA per records  - no medications   35yo  at 37+5wga by frozen embryo transfer, with ELDON 24. She is presenting due to sudden onset upper abdominal discomfort and tightening that began yesterday evening associated with nausea, vomiting, on and off headache , and body aches. Endorses frequent BM, but they are soft. Denies dizziness, SOB, chest pain, severe abdominal pain, urinary sxs, constipation, contractions, LOF, VB. Endorses good fetal movement.       Pregnancy complicated by:    #h/o endometriosis, uterine septum  - endometriosis excised laparoscopically  - hysteroscopic uterine septum excision, operative note in outside medical records ([facility]), arcuate uterus    #IVF pregnancy  - low risk NIPT, declined fetal echo or amniocentesis    #IBD - UC  - well controlled, diagnosed at age 12, no flare ups, reports occasional bloody stools  - rheumatoid arthritis diagnosed in outside records, negative rheumatoid factor and RASHEEDA per records  - no medications

## 2024-07-12 NOTE — PROCEDURE NOTE - ADDITIONAL PROCEDURE DETAILS
Thorough discussion of patient's history, as indicated above.  Discussed risks of spinal/epidural, including PDPH, inadequate analgesia occasionally requiring epidural catheter replacement/ general anesthesia, bleeding, infection and spinal cord injury. hypotension and nausea. Patient expressed understanding of these risks, signed informed consent and wishes to proceed with spinal/epidural.Patient sitting. Lumbar epidural performed at L3-4. Standard ASA monitors including FHR. Sterile gloves, Chloro-prep. 1% lidocaine for local infiltration. 17g touhy. JEAN MARIE with air at 5 cm. 27g lydia used to make a dural puncture with + CSF. Lydia needle removed and epidural catheter threaded easily. Touhy needle removed. Catheter secured in place at 10  cm. Negative aspiration. Test dose consisting of 3ml 1.5% lidocaine with epinephrine was negative. Patient tolerated procedure and was hemodynamically stable throughout. T10 level bilaterally.

## 2024-07-12 NOTE — PROGRESS NOTE ADULT - SUBJECTIVE AND OBJECTIVE BOX
PGY1 Note    Patient seen at bedside for evaluation of labor progression. Pt appears comfortable, epidural in, does not endorse any pain or acute events. AROM @, fluid bloody. Pt comfortable after.     T(F): 98.06 (18:29)  HR: 58 (20:38)  BP: 146/95 (20:32)  RR: 20 (18:29)  (ADM OVERRIDE) 1 each &lt;see task&gt; GiveOnce  (ADM OVERRIDE) 1 each &lt;see task&gt; GiveOnce  (ADM OVERRIDE) 1 each &lt;see task&gt; GiveOnce  (ADM OVERRIDE) 1 each &lt;see task&gt; GiveOnce  ondansetron Injectable 4 milliGRAM(s) IV Push once    EFM: 130, mod variability, +accels, -decels. Cat I tracing  TOCO: q3-4   SVE: s/p AROM bloody, 3/80/-2    Labs:                        13.1   11.17 )-----------( 267      ( 2024 13:24 )             37.6     07-12    131<L>  |  103  |  12  ----------------------------<  106<H>  4.1   |  17  |  0.5<L>    Ca    9.5      2024 13:24    TPro  6.3  /  Alb  3.4<L>  /  TBili  <0.2  /  DBili  x   /  AST  16  /  ALT  10  /  AlkPhos  153<H>  -12    ABO RH Interpretation: B POS (24 @ 16:17)    Urinalysis Basic - ( 2024 17:01 )    Color: Yellow / Appearance: Clear / S.012 / pH: x  Gluc: x / Ketone: Negative mg/dL  / Bili: Negative / Urobili: 0.2 mg/dL   Blood: x / Protein: Negative mg/dL / Nitrite: Negative   Leuk Esterase: Moderate / RBC: 0 /HPF / WBC 7 /HPF   Sq Epi: x / Non Sq Epi: 3 /HPF / Bacteria: Occasional /HPF          Meds: lactated ringers. 1000 milliLiter(s) IV Continuous <Continuous>  oxytocin Infusion 333.333 milliUNIT(s)/Min IV Continuous <Continuous>  oxytocin Infusion. 2 milliUNIT(s)/Min IV Continuous <Continuous>     PGY1 Note    Patient seen at bedside for evaluation of labor progression. Pt appears comfortable, epidural in, does not endorse any pain or acute events. AROM @, fluid bloody. Pt comfortable after.     T(F): 98.06 (18:29)  HR: 58 (20:38)  BP: 146/95 (20:32)  RR: 20 (18:29)    ondansetron Injectable 4 milliGRAM(s) IV Push once    EFM: 130, mod variability, +accels, -decels. Cat I tracing  TOCO: q3-4   SVE: s/p AROM bloody, 3/80/-2    Labs:                        13.1   11.17 )-----------( 267      ( 2024 13:24 )             37.6         131<L>  |  103  |  12  ----------------------------<  106<H>  4.1   |  17  |  0.5<L>    Ca    9.5      2024 13:24    TPro  6.3  /  Alb  3.4<L>  /  TBili  <0.2  /  DBili  x   /  AST  16  /  ALT  10  /  AlkPhos  153<H>      ABO RH Interpretation: B POS (24 @ 16:17)    Urinalysis Basic - ( 2024 17:01 )    Color: Yellow / Appearance: Clear / S.012 / pH: x  Gluc: x / Ketone: Negative mg/dL  / Bili: Negative / Urobili: 0.2 mg/dL   Blood: x / Protein: Negative mg/dL / Nitrite: Negative   Leuk Esterase: Moderate / RBC: 0 /HPF / WBC 7 /HPF   Sq Epi: x / Non Sq Epi: 3 /HPF / Bacteria: Occasional /HPF          Meds: lactated ringers. 1000 milliLiter(s) IV Continuous <Continuous>  oxytocin Infusion 333.333 milliUNIT(s)/Min IV Continuous <Continuous>  oxytocin Infusion. 2 milliUNIT(s)/Min IV Continuous <Continuous>

## 2024-07-12 NOTE — OB PROVIDER TRIAGE NOTE - NS_OBGYNHISTORY_OBGYN_ALL_OB_FT
Ob Hx:   GYN Hx: 3cm fibroid on sonogram. Denies abnormal pap smears, STIs. History of laparoscopic cystectomy of endometrioma in . History of hysteroscopic septum resection in .

## 2024-07-13 LAB
BASOPHILS # BLD AUTO: 0.05 K/UL — SIGNIFICANT CHANGE UP (ref 0–0.2)
BASOPHILS NFR BLD AUTO: 0.3 % — SIGNIFICANT CHANGE UP (ref 0–1)
EOSINOPHIL # BLD AUTO: 0.09 K/UL — SIGNIFICANT CHANGE UP (ref 0–0.7)
EOSINOPHIL NFR BLD AUTO: 0.5 % — SIGNIFICANT CHANGE UP (ref 0–8)
HCT VFR BLD CALC: 32.3 % — LOW (ref 37–47)
HGB BLD-MCNC: 11.1 G/DL — LOW (ref 12–16)
IMM GRANULOCYTES NFR BLD AUTO: 0.7 % — HIGH (ref 0.1–0.3)
LYMPHOCYTES # BLD AUTO: 15.8 % — LOW (ref 20.5–51.1)
LYMPHOCYTES # BLD AUTO: 2.98 K/UL — SIGNIFICANT CHANGE UP (ref 1.2–3.4)
MCHC RBC-ENTMCNC: 28 PG — SIGNIFICANT CHANGE UP (ref 27–31)
MCHC RBC-ENTMCNC: 34.4 G/DL — SIGNIFICANT CHANGE UP (ref 32–37)
MCV RBC AUTO: 81.4 FL — SIGNIFICANT CHANGE UP (ref 81–99)
MONOCYTES # BLD AUTO: 0.48 K/UL — SIGNIFICANT CHANGE UP (ref 0.1–0.6)
MONOCYTES NFR BLD AUTO: 2.6 % — SIGNIFICANT CHANGE UP (ref 1.7–9.3)
NEUTROPHILS # BLD AUTO: 15.07 K/UL — HIGH (ref 1.4–6.5)
NEUTROPHILS NFR BLD AUTO: 80.1 % — HIGH (ref 42.2–75.2)
NRBC # BLD: 0 /100 WBCS — SIGNIFICANT CHANGE UP (ref 0–0)
PLATELET # BLD AUTO: 239 K/UL — SIGNIFICANT CHANGE UP (ref 130–400)
PMV BLD: 10.5 FL — HIGH (ref 7.4–10.4)
RBC # BLD: 3.97 M/UL — LOW (ref 4.2–5.4)
RBC # FLD: 14.1 % — SIGNIFICANT CHANGE UP (ref 11.5–14.5)
WBC # BLD: 18.81 K/UL — HIGH (ref 4.8–10.8)
WBC # FLD AUTO: 18.81 K/UL — HIGH (ref 4.8–10.8)

## 2024-07-13 PROCEDURE — 59409 OBSTETRICAL CARE: CPT | Mod: U7

## 2024-07-13 RX ORDER — LANOLIN
1 WAX (GRAM) MISCELLANEOUS EVERY 6 HOURS
Refills: 0 | Status: DISCONTINUED | OUTPATIENT
Start: 2024-07-13 | End: 2024-07-17

## 2024-07-13 RX ORDER — SIMETHICONE 40MG/0.6ML
80 SUSPENSION, DROPS(FINAL DOSAGE FORM)(ML) ORAL EVERY 4 HOURS
Refills: 0 | Status: DISCONTINUED | OUTPATIENT
Start: 2024-07-13 | End: 2024-07-17

## 2024-07-13 RX ORDER — DIBUCAINE 1 %
1 OINTMENT (GRAM) TOPICAL EVERY 6 HOURS
Refills: 0 | Status: DISCONTINUED | OUTPATIENT
Start: 2024-07-13 | End: 2024-07-17

## 2024-07-13 RX ORDER — BENZOCAINE 15 %
1 LIQUID (ML) TOPICAL EVERY 6 HOURS
Refills: 0 | Status: DISCONTINUED | OUTPATIENT
Start: 2024-07-13 | End: 2024-07-17

## 2024-07-13 RX ORDER — OXYTOCIN 30 [USP'U]/500ML
41.67 INJECTION, SOLUTION INTRAVENOUS
Qty: 20 | Refills: 0 | Status: DISCONTINUED | OUTPATIENT
Start: 2024-07-13 | End: 2024-07-17

## 2024-07-13 RX ORDER — DIPHENHYDRAMINE HCL 12.5MG/5ML
25 ELIXIR ORAL EVERY 6 HOURS
Refills: 0 | Status: DISCONTINUED | OUTPATIENT
Start: 2024-07-13 | End: 2024-07-17

## 2024-07-13 RX ORDER — PRENATAL VIT/IRON FUM/FOLIC AC 60 MG-1 MG
1 TABLET ORAL DAILY
Refills: 0 | Status: DISCONTINUED | OUTPATIENT
Start: 2024-07-13 | End: 2024-07-17

## 2024-07-13 RX ORDER — TETANUS TOXOID, REDUCED DIPHTHERIA TOXOID AND ACELLULAR PERTUSSIS VACCINE, ADSORBED 5; 2.5; 8; 8; 2.5 [IU]/.5ML; [IU]/.5ML; UG/.5ML; UG/.5ML; UG/.5ML
0.5 SUSPENSION INTRAMUSCULAR ONCE
Refills: 0 | Status: DISCONTINUED | OUTPATIENT
Start: 2024-07-13 | End: 2024-07-17

## 2024-07-13 RX ORDER — OXYCODONE HYDROCHLORIDE 100 MG/5ML
5 SOLUTION ORAL ONCE
Refills: 0 | Status: DISCONTINUED | OUTPATIENT
Start: 2024-07-13 | End: 2024-07-17

## 2024-07-13 RX ORDER — ACETAMINOPHEN 325 MG
975 TABLET ORAL
Refills: 0 | Status: DISCONTINUED | OUTPATIENT
Start: 2024-07-13 | End: 2024-07-17

## 2024-07-13 RX ORDER — KETOROLAC TROMETHAMINE 30 MG/ML
30 INJECTION, SOLUTION INTRAMUSCULAR ONCE
Refills: 0 | Status: DISCONTINUED | OUTPATIENT
Start: 2024-07-13 | End: 2024-07-13

## 2024-07-13 RX ORDER — HYDROCORTISONE ACETATE 1 %
1 OINTMENT (GRAM) RECTAL EVERY 4 HOURS
Refills: 0 | Status: DISCONTINUED | OUTPATIENT
Start: 2024-07-13 | End: 2024-07-17

## 2024-07-13 RX ORDER — HYDROCORTISONE VALERATE 0.2 %
1 CREAM (GRAM) TOPICAL EVERY 6 HOURS
Refills: 0 | Status: DISCONTINUED | OUTPATIENT
Start: 2024-07-13 | End: 2024-07-17

## 2024-07-13 RX ORDER — PRAMOXINE HCL 1 %
1 CREAM (GRAM) RECTAL EVERY 4 HOURS
Refills: 0 | Status: DISCONTINUED | OUTPATIENT
Start: 2024-07-13 | End: 2024-07-17

## 2024-07-13 RX ORDER — OXYCODONE HYDROCHLORIDE 100 MG/5ML
5 SOLUTION ORAL
Refills: 0 | Status: DISCONTINUED | OUTPATIENT
Start: 2024-07-13 | End: 2024-07-17

## 2024-07-13 RX ORDER — SODIUM CHLORIDE 0.9 % (FLUSH) 0.9 %
3 SYRINGE (ML) INJECTION EVERY 8 HOURS
Refills: 0 | Status: DISCONTINUED | OUTPATIENT
Start: 2024-07-13 | End: 2024-07-17

## 2024-07-13 RX ADMIN — Medication 600 MILLIGRAM(S): at 18:11

## 2024-07-13 RX ADMIN — Medication 600 MILLIGRAM(S): at 17:11

## 2024-07-13 RX ADMIN — Medication 3 MILLILITER(S): at 22:14

## 2024-07-13 RX ADMIN — OXYTOCIN 125 MILLIUNIT(S)/MIN: 30 INJECTION, SOLUTION INTRAVENOUS at 08:55

## 2024-07-13 RX ADMIN — Medication 975 MILLIGRAM(S): at 21:36

## 2024-07-13 RX ADMIN — Medication 3 MILLILITER(S): at 16:11

## 2024-07-13 RX ADMIN — Medication 975 MILLIGRAM(S): at 20:45

## 2024-07-13 NOTE — PROGRESS NOTE ADULT - ASSESSMENT
A/P:  34y  at 37w6d, GBS negative, hx UC, IVF pregnancy, and multiple uterine surgeries, with gHTN, fully dilated.    - Cont EFM/[place]  - IVF hydration  - Pain management w/ epidural  - Monitor vitals  - Clear liquid diet  - PELs nrormal  - S/p AROM

## 2024-07-13 NOTE — PROGRESS NOTE ADULT - SUBJECTIVE AND OBJECTIVE BOX
PGY4 Note    S: Patient seen and examined at bedside. Doing well, feeling some pressure, pain well well controlled.     Vital Signs Last 24 Hrs  T(C): 36.7 (2024 18:29), Max: 36.7 (2024 18:29)  T(F): 98.06 (2024 18:29), Max: 98.06 (2024 18:29)  HR: 57 (2024 00:48) (49 - 120)  BP: 128/82 (2024 00:46) (107/72 - 155/85)    RR: 20 (2024 18:29) (18 - 20)  SpO2: 97% (2024 00:43) (91% - 99%)    Parameters below as of 2024 15:34  Patient On (Oxygen Delivery Method): room air    EFM: 120 mod danie pos accel  TOCO:  q2-3 min  SVE:  4/80/-1    Labs:                        13.1   11.17 )-----------( 267      ( 2024 13:24 )             37.6       07-12    131<L>  |  103  |  12  ----------------------------<  106<H>  4.1   |  17  |  0.5<L>    Ca    9.5      2024 13:24    TPro  6.3  /  Alb  3.4<L>  /  TBili  <0.2  /  DBili  x   /  AST  16  /  ALT  10  /  AlkPhos  153<H>  07-12    ABO RH Interpretation: B POS (24 @ 16:17)    Urinalysis Basic - ( 2024 17:01 )    Color: Yellow / Appearance: Clear / S.012 / pH: x  Gluc: x / Ketone: Negative mg/dL  / Bili: Negative / Urobili: 0.2 mg/dL   Blood: x / Protein: Negative mg/dL / Nitrite: Negative   Leuk Esterase: Moderate / RBC: 0 /HPF / WBC 7 /HPF   Sq Epi: x / Non Sq Epi: 3 /HPF / Bacteria: Occasional /HPF    Prenatal Syphilis Test: Nonreact (24 @ 16:17)

## 2024-07-13 NOTE — PROGRESS NOTE ADULT - ASSESSMENT
A/P:  34y  at 37w6d, GBS negative, hx UC, IVF pregnancy, and multiple uterine surgeries, with gHTN, in labor.   - Cont EFM/[place]  - IVF hydration  - Pain management w/ epidural  - Monitor vitals  - Clear liquid diet  - PELs nrormal  - C/w pitocin 2x2  - S/p AROM

## 2024-07-13 NOTE — PROGRESS NOTE ADULT - ASSESSMENT
A/P:  34y  at 37w6d, GBS negative, hx UC, IVF pregnancy, and multiple uterine surgeries, with gHTN, in labor.   - Cont EFM/[place]  - IVF hydration  - Pain management w/ epidural  - Monitor vitals  - Clear liquid diet  - PELs nrormal  - C/w pitocin 2x2  - S/p AROM

## 2024-07-13 NOTE — PROGRESS NOTE ADULT - SUBJECTIVE AND OBJECTIVE BOX
PGY4 Note    S: Patient seen and examined at bedside. Doing well, feeling more pelvic pressure.     Vital Signs Last 24 Hrs  T(C): 36.7 (2024 18:29), Max: 36.7 (2024 18:29)  T(F): 98.06 (2024 18:29), Max: 98.06 (2024 18:29)  HR: 66 (2024 02:16) (47 - 120)  BP: 137/97 (2024 02:16) (107/72 - 155/85)    RR: 20 (2024 18:29) (18 - 20)  SpO2: 98% (2024 01:48) (91% - 99%)    Parameters below as of 2024 15:34  Patient On (Oxygen Delivery Method): room air    EFM: 120/mod danie/pos accel  TOCO: q2-4 min  SVE: 5/80/-1 vtx ruptured    Labs:                        13.1   11.17 )-----------( 267      ( 2024 13:24 )             37.6       07-12    131<L>  |  103  |  12  ----------------------------<  106<H>  4.1   |  17  |  0.5<L>    Ca    9.5      2024 13:24    TPro  6.3  /  Alb  3.4<L>  /  TBili  <0.2  /  DBili  x   /  AST  16  /  ALT  10  /  AlkPhos  153<H>  07-12    ABO RH Interpretation: B POS (24 @ 16:17)    Urinalysis Basic - ( 2024 17:01 )    Color: Yellow / Appearance: Clear / S.012 / pH: x  Gluc: x / Ketone: Negative mg/dL  / Bili: Negative / Urobili: 0.2 mg/dL   Blood: x / Protein: Negative mg/dL / Nitrite: Negative   Leuk Esterase: Moderate / RBC: 0 /HPF / WBC 7 /HPF   Sq Epi: x / Non Sq Epi: 3 /HPF / Bacteria: Occasional /HPF    Prenatal Syphilis Test: Nonreact (24 @ 16:17)

## 2024-07-13 NOTE — OB RN DELIVERY SUMMARY - NSSELHIDDEN_OBGYN_ALL_OB_FT
[[email or web address]_ALL_OB_FT:[id number]],[NS_DeliveryRN_OBGYN_ALL_OB_FT:[id number]]

## 2024-07-13 NOTE — OB PROVIDER DELIVERY SUMMARY - NSSELHIDDEN_OBGYN_ALL_OB_FT
[NS_[email or web address]_ALL_OB_FT:[id number]],[NS_DeliveryRN_OBGYN_ALL_OB_FT:[email or web address]=],[NS_DeliveryAssist2_OBGYN_ALL_OB_FT:[id number]]

## 2024-07-13 NOTE — PROGRESS NOTE ADULT - SUBJECTIVE AND OBJECTIVE BOX
PGY3 Note    Patient seen at bedside for evaluation of labor progression, doing well, no complaints.     T(F): 98.06 (24 @ 18:29), Max: 98.06 (24 @ 18:29)  HR: 100 (24 @ 06:47) (47 - 120)  BP: 138/101 (24 @ 06:47) (107/72 - 159/89)  RR: 20 (24 @ 18:29) (18 - 20)  SpO2: 98% (24 @ 06:45) (91% - 99%)    EFM:140bpm/mod/posaccels  TOCO:q2-3min  SVE:10100/-1    Medications:  lactated ringers.: 125 mL/Hr (24 @ 12:46)  lactated ringers.: 125 mL/Hr (24 @ 16:16)  ondansetron Injectable: 4 milliGRAM(s) (24 @ 13:20)  pitocin @ 12mun/min      Labs:                        13.1   11.17 )-----------( 267      ( 2024 13:24 )             37.6     07-12    131<L>  |  103  |  12  ----------------------------<  106<H>  4.1   |  17  |  0.5<L>    Ca    9.5      2024 13:24    TPro  6.3  /  Alb  3.4<L>  /  TBili  <0.2  /  DBili  x   /  AST  16  /  ALT  10  /  AlkPhos  153<H>  -    ABO RH Interpretation: B POS (24 @ 16:17)    Antibody Screen: NEG (24 @ 16:17)    Urinalysis Basic - ( 2024 17:01 )    Color: Yellow / Appearance: Clear / S.012 / pH: x  Gluc: x / Ketone: Negative mg/dL  / Bili: Negative / Urobili: 0.2 mg/dL   Blood: x / Protein: Negative mg/dL / Nitrite: Negative   Leuk Esterase: Moderate / RBC: 0 /HPF / WBC 7 /HPF   Sq Epi: x / Non Sq Epi: 3 /HPF / Bacteria: Occasional /HPF      L&amp;D Drug Screen, Urine: Done (24 @ 17:01)    Prenatal Syphilis Test: Nonreact (24 @ 16:17)    Uric Acid: 7.3 mg/dL (24 @ 13:24)    Lactate Dehydrogenase, Serum: 178 (24 @ 13:24)    Protein/Creatinine Ratio Calculation: 0.2 Ratio (24 @ 17:01)

## 2024-07-13 NOTE — OB PROVIDER DELIVERY SUMMARY - NSPROVIDERDELIVERYNOTE_OBGYN_ALL_OB_FT
Patient was fully dilated and pushing. Head delivered OA, restituted to BEATRICE, Anterior shoulder delivered, followed by the posterior shoulder, rest of the body without complications. Baby suctioned, cord clamped and cut, and infant placed on mothers abdomen. Cord segment and blood obtained. Placenta delivered, intact. Fundus massaged and firm, with good hemostasis. Pitocin given postpartum. Vaginal vault, perineum, and cervix inspected, and second degree perineal laceration noted and repaired with 2-0 chromic in usual fashion. Left periurethral laceration also repaired with chromic in usual fasion. Live infant delivered.

## 2024-07-14 LAB
APPEARANCE UR: CLEAR — SIGNIFICANT CHANGE UP
BACTERIA # UR AUTO: NEGATIVE /HPF — SIGNIFICANT CHANGE UP
BILIRUB UR-MCNC: NEGATIVE — SIGNIFICANT CHANGE UP
CAST: 0 /LPF — SIGNIFICANT CHANGE UP (ref 0–4)
COLOR SPEC: YELLOW — SIGNIFICANT CHANGE UP
DIFF PNL FLD: ABNORMAL
GLUCOSE UR QL: NEGATIVE MG/DL — SIGNIFICANT CHANGE UP
KETONES UR-MCNC: NEGATIVE MG/DL — SIGNIFICANT CHANGE UP
LEUKOCYTE ESTERASE UR-ACNC: ABNORMAL
NITRITE UR-MCNC: NEGATIVE — SIGNIFICANT CHANGE UP
PH UR: 6 — SIGNIFICANT CHANGE UP (ref 5–8)
PROT UR-MCNC: NEGATIVE MG/DL — SIGNIFICANT CHANGE UP
RBC CASTS # UR COMP ASSIST: 0 /HPF — SIGNIFICANT CHANGE UP (ref 0–4)
SP GR SPEC: 1 — SIGNIFICANT CHANGE UP (ref 1–1.03)
SQUAMOUS # UR AUTO: 0 /HPF — SIGNIFICANT CHANGE UP (ref 0–5)
UROBILINOGEN FLD QL: 0.2 MG/DL — SIGNIFICANT CHANGE UP (ref 0.2–1)
WBC UR QL: 3 /HPF — SIGNIFICANT CHANGE UP (ref 0–5)

## 2024-07-14 PROCEDURE — 99222 1ST HOSP IP/OBS MODERATE 55: CPT

## 2024-07-14 PROCEDURE — 99231 SBSQ HOSP IP/OBS SF/LOW 25: CPT

## 2024-07-14 PROCEDURE — 99238 HOSP IP/OBS DSCHRG MGMT 30/<: CPT

## 2024-07-14 RX ADMIN — Medication 975 MILLIGRAM(S): at 22:20

## 2024-07-14 RX ADMIN — Medication 3 MILLILITER(S): at 16:31

## 2024-07-14 RX ADMIN — Medication 600 MILLIGRAM(S): at 00:08

## 2024-07-14 RX ADMIN — Medication 975 MILLIGRAM(S): at 14:47

## 2024-07-14 RX ADMIN — Medication 3 MILLILITER(S): at 22:01

## 2024-07-14 RX ADMIN — Medication 600 MILLIGRAM(S): at 00:47

## 2024-07-14 RX ADMIN — Medication 975 MILLIGRAM(S): at 03:59

## 2024-07-14 RX ADMIN — Medication 975 MILLIGRAM(S): at 21:17

## 2024-07-14 RX ADMIN — Medication 3 MILLILITER(S): at 06:15

## 2024-07-14 RX ADMIN — Medication 600 MILLIGRAM(S): at 06:41

## 2024-07-14 RX ADMIN — Medication 975 MILLIGRAM(S): at 15:17

## 2024-07-14 RX ADMIN — Medication 600 MILLIGRAM(S): at 06:11

## 2024-07-14 RX ADMIN — Medication 975 MILLIGRAM(S): at 03:22

## 2024-07-14 RX ADMIN — OXYCODONE HYDROCHLORIDE 5 MILLIGRAM(S): 100 SOLUTION ORAL at 08:46

## 2024-07-14 NOTE — CHART NOTE - NSCHARTNOTEFT_GEN_A_CORE
PGY 4 Note    Called to patients bedside by nursing staff as patient had increased pain from this morning. Stated pain was 10/10 and associated with severe pelvic pressure. Abdomen noted to be diffusely tender. Pelvic exam performed and large distended bladder felt on bimanual. Gonzales catheter reinserted 2.5 L of urine drained from bladder. Of note, patient had been voiding but upon further investigation she reports smaller than expected amount and frequency. She also reports a history of urinary retention with prior procedures, as such, will keep gonzales catheter in place until tomorrow morning and reassess.    Dr. Trotter at bedside PGY 4 Note    Called to patients bedside by nursing staff as patient had increased pain from this morning. Stated pain was 10/10 and associated with severe pelvic pressure. Abdomen noted to be diffusely tender. Pelvic exam performed and large distended bladder felt on bimanual. Gonzales catheter reinserted 2.5 L of urine drained from bladder. Of note, patient had been voiding but upon further investigation she reports smaller than expected amount and frequency. She also reports a history of urinary retention with prior procedures, as such, will keep gonzales catheter in place for 4 days and see Dr. Mandie Sharma, Urogynecology next week for removal.     Dr. Trotter at bedside PGY 4 Note    Called to patients bedside by nursing staff as patient had increased pain from this morning. Stated pain was 10/10 and associated with severe pelvic pressure. Abdomen noted to be diffusely tender. Pelvic exam performed and large distended bladder felt on bimanual. Gonzales catheter reinserted 2.5 L of urine drained from bladder. Of note, patient had been voiding but upon further investigation she reports smaller than expected amount and frequency. She also reports a history of urinary retention with prior procedures, as such, will keep gonzales catheter in place for 4 days and see Dr. Mandie Sharma, Urogynecology next week for removal.     Dr. Trotter at bedside      OB Attending:  Came to evaluate patient for worsening pain. Pelvic exam demonstrated distended bladder, drained as above. Agree with above note.  WESTLEY Trotter

## 2024-07-14 NOTE — PROGRESS NOTE ADULT - ASSESSMENT
A/P: 33yo S/P  PPD2 c/w gHTN well controlled  , recovering well   - encourage ambulation, PO hydration  - monitor vitals, bleeding  - routine postpartum course  - monitor pain control   - monitor for signs of infection (fever, tachycardia, white count)

## 2024-07-14 NOTE — PROGRESS NOTE ADULT - SUBJECTIVE AND OBJECTIVE BOX
NOHEMI OROSCO  34y  Female    PGY1 Note:   Pt is a 33yo PPD#1. Pt is recovering well, complaints of generalized abdominal pressure and tenderness. Pt endorses pain getting worse when breast feeding. No burning or itching upon urination. Pt denies/complains of fever, chills, nausea, vomiting, SOB, chest pain, extremity swelling or pain.     gHTN or PreE: Pt denies headache, changes in vision, RUQ pain or palpitations.   drop in H&H: Pt denies of headache, dizziness, shortness of breath or syncope.     Ambulating: Yes  Voiding: Yes  Flatus: Yes  Bowel movements: Yes   Breast or bottle feeding: Breast  Diet: Regular    MEDICATIONS  (STANDING):  acetaminophen     Tablet .. 975 milliGRAM(s) Oral <User Schedule>  diphtheria/tetanus/pertussis (acellular) Vaccine (Adacel) 0.5 milliLiter(s) IntraMuscular once  ibuprofen  Tablet. 600 milliGRAM(s) Oral every 6 hours  oxytocin Infusion 41.667 milliUNIT(s)/Min (125 mL/Hr) IV Continuous <Continuous>  prenatal multivitamin 1 Tablet(s) Oral daily  sodium chloride 0.9% lock flush 3 milliLiter(s) IV Push every 8 hours    MEDICATIONS  (PRN):  benzocaine 20%/menthol 0.5% Spray 1 Spray(s) Topical every 6 hours PRN for Perineal discomfort  dibucaine 1% Ointment 1 Application(s) Topical every 6 hours PRN Perineal discomfort  diphenhydrAMINE 25 milliGRAM(s) Oral every 6 hours PRN Pruritus  hydrocortisone 1% Cream 1 Application(s) Topical every 6 hours PRN Moderate Pain (4-6)  lanolin Ointment 1 Application(s) Topical every 6 hours PRN nipple soreness  magnesium hydroxide Suspension 30 milliLiter(s) Oral two times a day PRN Constipation  oxyCODONE    IR 5 milliGRAM(s) Oral every 3 hours PRN Moderate to Severe Pain (4-10)  oxyCODONE    IR 5 milliGRAM(s) Oral once PRN Moderate to Severe Pain (4-10)  pramoxine 1%/zinc 5% Cream 1 Application(s) Topical every 4 hours PRN Moderate Pain (4-6)  simethicone 80 milliGRAM(s) Chew every 4 hours PRN Gas  witch hazel Pads 1 Application(s) Topical every 4 hours PRN Perineal discomfort      PAST MEDICAL & SURGICAL HISTORY:  Ulcerative colitis      H/O ovarian cystectomy      Status post hysteroscopic resection of uterine septum          Physical Exam  Vital Signs Last 24 Hrs  T(C): 36.6 (14 Jul 2024 08:16), Max: 36.6 (14 Jul 2024 08:16)  T(F): 97.9 (14 Jul 2024 08:16), Max: 97.9 (14 Jul 2024 08:16)  HR: 76 (14 Jul 2024 08:16) (61 - 111)  BP: 130/83 (14 Jul 2024 08:16) (114/69 - 130/83)  RR: 18 (14 Jul 2024 08:16) (18 - 18)  SpO2: 99% (14 Jul 2024 08:16) (97% - 99%)      Gen: AAOx3, NAD  Fundus: firm, below umbilicus   Abd: Soft, generalized tenderness, nondistended, BS+  Lochia: Minimal   Ext: No calf tenderness, 1+ pitting edema    Labs:                        11.1   18.81 )-----------( 239      ( 13 Jul 2024 20:28 )             32.3                         13.1   11.17 )-----------( 267      ( 12 Jul 2024 13:24 )             37.6

## 2024-07-14 NOTE — CONSULT NOTE ADULT - ATTENDING COMMENTS
33yo female presenting for labor induction s/p delivery asked to evaluate for ulcerative colitis reportedly diagnosed in [place] at age 12. No GI complaints currently and not on IBD medications. No prior colonoscopy. Can check baseline inflammatory markers. Recommend outpt GI follow up to reestablish care and further workup/mgmt.

## 2024-07-14 NOTE — CONSULT NOTE ADULT - SUBJECTIVE AND OBJECTIVE BOX
Gastroenterology Initial Consult Note      Location: [address] ([address])  Patient Name: NOHEMI OROSCO  Age: 34y  Gender: Female      Chief Complaint  Patient is a 34y old Female who presents with a chief complaint of   Primary diagnosis of Other pregnancy-related conditions, antepartum  Failed medical induction of labor      Reason for Consult  History of Ulcerative Colitis        History of Present Illness  34-year-old female patient with history of hypertension, reported ulcerative colitis not on any maintenance therapy, and history of endometriosis status post excision who presented on  for induction/augmentation and delivery in the setting of preeclampsia status post delivery on .  We are consulted for history of ulcerative colitis.  Summary:  * Diagnosed at age 12 (had hematochezia)  * Never had colonoscopy or EGD; had proctoscopy at the age of 12 in [place] (pathology confirmed ulcerative colitis per patient but no records are available)  * Has never been on any maintenance therapy for ulcerative colitis  * Has not had history of flares or admissions for ulcerative colitis  * No family history of GI cancers or IBD or other GI problem  * Currently reports 2-3 episodes of cramps per year; has 1-2 bowel movements every day(formed brown stools; intermittent hematochezia with small amount of blood occurring less than monthly after eating spicy food usually; last episode of small blood mixed with brown stool was on ); denies nausea or vomiting or weight loss; had heartburn during third trimester  * Hemodynamically stable  * Labs: WBC 11.17 on -> 18.81 on , hemoglobin 13.1 on -> 11.1 on ; platelet 239 on ; no inflammatory markers  * No abdominal imaging      Prior EGD:  no prior      Prior Colonoscopy:  no prior      Past Medical and Surgical History:  Ulcerative colitis  H/O ovarian cystectomy  Status post hysteroscopic resection of uterine septum        Home Medications:  per med rec      Social History:  Tobacco: denies  Alcohol: denies  Drugs: denies      Allergies:  No Known Allergies      Family History:  No pertinent family history in first degree relatives          Vital Signs in the last 24 hours   Vitals Summary T(C): 36.8 (24 @ 15:00), Max: 36.8 (24 @ 15:00)  HR: 89 (24 @ 15:00) (61 - 89)  BP: 125/79 (24 @ 15:00) (114/69 - 130/83)  RR: 18 (24 @ 15:00) (18 - 18)  SpO2: 99% (24 @ 15:00) (98% - 99%)  Vent Data   Intake/ Output   24 @ 07:01  -  24 @ 07:00  --------------------------------------------------------  IN: 0 mL / OUT: 2074 mL / NET: -2074 mL    24 @ 07:01  -  24 @ 19:42  --------------------------------------------------------  IN: 0 mL / OUT: 4075 mL / NET: -4075 mL      Physical Exam  * General Appearance: Alert, cooperative, interactive, oriented to time, place, and person, in no acute distress  * Eyes: PERRL, conjunctiva/corneas clear, EOM's intact, fundi benign, both eyes  * Lungs: Good bilateral air entry, normal breath sounds   * Heart: Regular Rate and Rhythm, normal S1 and S2, no audible murmur, rub, or gallop  * Abdomen: Symmetric, soft, non-tender at time of my evaluation, bowel sounds active all four quadrants      Investigations   Laboratory Workup      - CBC:                        11.1   18.81 )-----------( 239      ( 2024 20:28 )             32.3       - Hgb Trend:  11.1  24 @ 20:28  13.1  24 @ 13:24      24 @ 07:01  -  24 @ 07:00  --------------------------------------------------------      Liver panel trend:  TBili <0.2   /   AST 16   /   ALT 10   /   AlkP 153   /   Tptn 6.3   /   Alb 3.4    /   DBili --            Microbiological Workup  Urinalysis Basic - ( 2024 15:10 )    Color: Yellow / Appearance: Clear / S.005 / pH: x  Gluc: x / Ketone: Negative mg/dL  / Bili: Negative / Urobili: 0.2 mg/dL   Blood: x / Protein: Negative mg/dL / Nitrite: Negative   Leuk Esterase: Trace / RBC: 0 /HPF / WBC 3 /HPF   Sq Epi: x / Non Sq Epi: 0 /HPF / Bacteria: Negative /HPF          Current Medications  Standing Medications  acetaminophen     Tablet .. 975 milliGRAM(s) Oral <User Schedule>  diphtheria/tetanus/pertussis (acellular) Vaccine (Adacel) 0.5 milliLiter(s) IntraMuscular once  oxytocin Infusion 41.667 milliUNIT(s)/Min (125 mL/Hr) IV Continuous <Continuous>  prenatal multivitamin 1 Tablet(s) Oral daily  sodium chloride 0.9% lock flush 3 milliLiter(s) IV Push every 8 hours    PRN Medications  benzocaine 20%/menthol 0.5% Spray 1 Spray(s) Topical every 6 hours PRN for Perineal discomfort  dibucaine 1% Ointment 1 Application(s) Topical every 6 hours PRN Perineal discomfort  diphenhydrAMINE 25 milliGRAM(s) Oral every 6 hours PRN Pruritus  hydrocortisone 1% Cream 1 Application(s) Topical every 6 hours PRN Moderate Pain (4-6)  lanolin Ointment 1 Application(s) Topical every 6 hours PRN nipple soreness  magnesium hydroxide Suspension 30 milliLiter(s) Oral two times a day PRN Constipation  oxyCODONE    IR 5 milliGRAM(s) Oral every 3 hours PRN Moderate to Severe Pain (4-10)  oxyCODONE    IR 5 milliGRAM(s) Oral once PRN Moderate to Severe Pain (4-10)  pramoxine 1%/zinc 5% Cream 1 Application(s) Topical every 4 hours PRN Moderate Pain (4-6)  simethicone 80 milliGRAM(s) Chew every 4 hours PRN Gas  witch hazel Pads 1 Application(s) Topical every 4 hours PRN Perineal discomfort    Singles Doses Administered  (ADM OVERRIDE) 1 each &lt;see task&gt; GiveOnce  (ADM OVERRIDE) 1 each &lt;see task&gt; GiveOnce  (ADM OVERRIDE) 1 each &lt;see task&gt; GiveOnce  (ADM OVERRIDE) 1 each &lt;see task&gt; GiveOnce  (ADM OVERRIDE) 1 each &lt;see task&gt; GiveOnce  ibuprofen  Tablet. 600 milliGRAM(s) Oral every 6 hours  ondansetron Injectable 4 milliGRAM(s) IV Push once

## 2024-07-15 ENCOUNTER — TRANSCRIPTION ENCOUNTER (OUTPATIENT)
Age: 34
End: 2024-07-15

## 2024-07-15 ENCOUNTER — APPOINTMENT (OUTPATIENT)
Dept: ANTEPARTUM | Facility: CLINIC | Age: 34
End: 2024-07-15

## 2024-07-15 PROCEDURE — 99232 SBSQ HOSP IP/OBS MODERATE 35: CPT

## 2024-07-15 PROCEDURE — 99231 SBSQ HOSP IP/OBS SF/LOW 25: CPT

## 2024-07-15 RX ADMIN — OXYCODONE HYDROCHLORIDE 5 MILLIGRAM(S): 100 SOLUTION ORAL at 15:33

## 2024-07-15 RX ADMIN — Medication 975 MILLIGRAM(S): at 09:00

## 2024-07-15 RX ADMIN — OXYCODONE HYDROCHLORIDE 5 MILLIGRAM(S): 100 SOLUTION ORAL at 16:03

## 2024-07-15 RX ADMIN — Medication 3 MILLILITER(S): at 15:27

## 2024-07-15 RX ADMIN — Medication 975 MILLIGRAM(S): at 20:59

## 2024-07-15 RX ADMIN — Medication 3 MILLILITER(S): at 06:13

## 2024-07-15 RX ADMIN — Medication 975 MILLIGRAM(S): at 09:30

## 2024-07-15 RX ADMIN — Medication 30 MILLILITER(S): at 16:49

## 2024-07-15 RX ADMIN — Medication 975 MILLIGRAM(S): at 03:30

## 2024-07-15 RX ADMIN — Medication 975 MILLIGRAM(S): at 21:29

## 2024-07-15 RX ADMIN — Medication 3 MILLILITER(S): at 22:00

## 2024-07-15 RX ADMIN — Medication 975 MILLIGRAM(S): at 04:13

## 2024-07-15 RX ADMIN — Medication 1 TABLET(S): at 11:21

## 2024-07-15 NOTE — DISCHARGE NOTE OB - CARE PROVIDERS DIRECT ADDRESSES
[email or web address]

## 2024-07-15 NOTE — PROGRESS NOTE ADULT - SUBJECTIVE AND OBJECTIVE BOX
PGY1 Note    Patient seen and examined. Pain well controlled at this time. Denies fever, chills, nausea, vomiting, chest pain, shortness of breath, severe abdominal pain, heavy vaginal bleeding. Patient is  Ambulating.   Passing flatus, Denies bowel movement.   Diet: Regular, tolerating PO  Voiding: gonzales catheter in place.   Preeclamptic: Denies headache, scotomata, and RUQ pain    MEDICATIONS  (STANDING):  acetaminophen     Tablet .. 975 milliGRAM(s) Oral <User Schedule>  diphtheria/tetanus/pertussis (acellular) Vaccine (Adacel) 0.5 milliLiter(s) IntraMuscular once  oxytocin Infusion 41.667 milliUNIT(s)/Min (125 mL/Hr) IV Continuous <Continuous>  prenatal multivitamin 1 Tablet(s) Oral daily  sodium chloride 0.9% lock flush 3 milliLiter(s) IV Push every 8 hours    MEDICATIONS  (PRN):  benzocaine 20%/menthol 0.5% Spray 1 Spray(s) Topical every 6 hours PRN for Perineal discomfort  dibucaine 1% Ointment 1 Application(s) Topical every 6 hours PRN Perineal discomfort  diphenhydrAMINE 25 milliGRAM(s) Oral every 6 hours PRN Pruritus  hydrocortisone 1% Cream 1 Application(s) Topical every 6 hours PRN Moderate Pain (4-6)  lanolin Ointment 1 Application(s) Topical every 6 hours PRN nipple soreness  magnesium hydroxide Suspension 30 milliLiter(s) Oral two times a day PRN Constipation  oxyCODONE    IR 5 milliGRAM(s) Oral every 3 hours PRN Moderate to Severe Pain (4-10)  oxyCODONE    IR 5 milliGRAM(s) Oral once PRN Moderate to Severe Pain (4-10)  pramoxine 1%/zinc 5% Cream 1 Application(s) Topical every 4 hours PRN Moderate Pain (4-6)  simethicone 80 milliGRAM(s) Chew every 4 hours PRN Gas  witch hazel Pads 1 Application(s) Topical every 4 hours PRN Perineal discomfort      Physical Exam  Vital Signs Last 24 Hrs  T(C): 36.8 (15 Jul 2024 03:31), Max: 36.8 (14 Jul 2024 15:00)  T(F): 98.2 (15 Jul 2024 03:31), Max: 98.3 (14 Jul 2024 15:00)  HR: 78 (15 Jul 2024 03:31) (76 - 89)  BP: 114/76 (15 Jul 2024 03:31) (106/71 - 130/83)  RR: 18 (15 Jul 2024 03:31) (18 - 18)  SpO2: 98% (15 Jul 2024 03:31) (98% - 99%)      Gen: AAOx3, NAD  Ext: No calf tenderness, no swelling  Fundus: firm, below umbilicus. Tender to palpation.  Abd: Soft, nontender, nondistended, BS+  Lochia: minimal moderate      Labs:                        11.1   18.81 )-----------( 239      ( 13 Jul 2024 20:28 )             32.3                         13.1   11.17 )-----------( 267      ( 12 Jul 2024 13:24 )             37.6     PGY1 Note    Patient seen and examined. Pain well controlled at this time. Denies fever, chills, nausea, vomiting, chest pain, shortness of breath, severe abdominal pain, heavy vaginal bleeding. Patient is  Ambulating.   Passing flatus, Denies bowel movement.   Diet: Regular, tolerating PO  Voiding: gonzales catheter in place.   Preeclamptic: Denies headache, scotomata, and RUQ pain      Physical Exam  Vital Signs Last 24 Hrs  T(C): 36.8 (15 Jul 2024 03:31), Max: 36.8 (14 Jul 2024 15:00)  T(F): 98.2 (15 Jul 2024 03:31), Max: 98.3 (14 Jul 2024 15:00)  HR: 78 (15 Jul 2024 03:31) (76 - 89)  BP: 114/76 (15 Jul 2024 03:31) (106/71 - 130/83)  RR: 18 (15 Jul 2024 03:31) (18 - 18)  SpO2: 98% (15 Jul 2024 03:31) (98% - 99%)      Gen: AAOx3, NAD  Ext: No calf tenderness, no swelling  Fundus: firm, below umbilicus. Tender to palpation.  Abd: Soft, nontender, nondistended, BS+  Lochia: minimal moderate      Labs:                        11.1   18.81 )-----------( 239      ( 13 Jul 2024 20:28 )             32.3                         13.1   11.17 )-----------( 267      ( 12 Jul 2024 13:24 )             37.6

## 2024-07-15 NOTE — DISCHARGE NOTE OB - MEDICATION SUMMARY - MEDICATIONS TO TAKE
I will START or STAY ON the medications listed below when I get home from the hospital:    acetaminophen 325 mg oral tablet  -- 3 tab(s) by mouth every 6 hours  -- Indication: For pain    Prenatal Multivitamins with Folic Acid 1 mg oral tablet  -- 1 tab(s) by mouth once a day  -- Indication: For healthy mother    simethicone 80 mg oral tablet, chewable  -- 1 tab(s) by mouth every 4 hours As needed Gas  -- Indication: For gas pain    nitrofurantoin macrocrystals-monohydrate 100 mg oral capsule  -- 1 cap(s) by mouth 2 times a day  -- Indication: For UTI

## 2024-07-15 NOTE — DISCHARGE NOTE OB - HOSPITAL COURSE
Patient presented to labor and delivery at 37+5wga of IVF pregnancy with elevated blood pressures concerning for gestational htn vs. Pre-Eclampsia. Decision was made for IOL. Patient delivered uncomplicated. Post operative course complicated by urinary retention, will return home with gonzales and follow up with Dr. Sharma 4 days post discharge.  Patient presented to labor and delivery at 37+5wga of IVF pregnancy with elevated blood pressures concerning for gestational htn vs. Pre-Eclampsia. Decision was made for IOL. Patient delivered uncomplicated. Post operative course complicated by urinary retention, passed active trial of void and follow up with Dr. Sharma 4 days post discharge.

## 2024-07-15 NOTE — DISCHARGE NOTE OB - CARE PROVIDER_API CALL
Michael Vo  Obstetrics and Gynecology  [address] [place] [address]  Phone: [phone]  Fax: [phone]  Follow Up Time:     Mandie Sharma [facility]  [address] [place] [address]  Phone: [phone]  Fax: [phone]  Follow Up Time:

## 2024-07-15 NOTE — ANESTHESIA FOLLOW-UP NOTE - NSEVALATIONFT_GEN_ALL_CORE
Patient has urinary retention. Per patient this happened to her after her delivery in 2018 and it resolved in 3 day. Patient in bed with Batres catheter. Ambulating without weakness. Denies headache.

## 2024-07-15 NOTE — DISCHARGE NOTE OB - PATIENT PORTAL LINK FT
You can access the FollowMyHealth Patient Portal offered by [facility] by registering at the following website: [email or web address]. By joining [employer]’s FollowMyHealth portal, you will also be able to view your health information using other applications (apps) compatible with our system.

## 2024-07-15 NOTE — ANESTHESIA FOLLOW-UP NOTE - NSEVALATION_GEN_ALL_CORE
No apparent complications or complaints regarding anesthesia care at this time/All questions were answered All questions were answered

## 2024-07-15 NOTE — PROGRESS NOTE ADULT - ASSESSMENT
35yo now P1 at PPD#2. Pt complaining of generalized abdominal pressure and tenderness, s/p gonzales for urinary retention.     Plan:     #Urinary Retention  -will d/c with gonzales and leg bag for 4 days  -f/u with UroGyn oupatient   -d/c with VNS    #Hx of UC  -GI consult says outpatient follow up  -No accute flare    #PostPartum  - encourage ambulation  - PO hydration  - Continue diet as tolerated   - Monitor vitals and bleeding  - anticipate d/c home  and is instructed to follow up in 6 weeks.      Discussed with Dr. Trotter, Dr. Antunez 35yo now P1 at PPD#2. gonzales in place for urinary retention.     Plan:     #Urinary Retention  -will d/c with gonzales and leg bag for 4 days  -f/u with UroGyn oupatient   -d/c with VNS    #Hx of UC  -GI consult says outpatient follow up  -No accute flare    #PostPartum  - encourage ambulation  - PO hydration  - Continue diet as tolerated   - Monitor vitals and bleeding  - anticipate d/c home  and is instructed to follow up in 6 weeks.       35yo now P1 at PPD#2, H/O UC,  gonzales in place for urinary retention.     Plan:     #Urinary Retention  -will d/c with gonzales and leg bag for 4 days  -f/u with UroGyn oupatient   -d/c with VNS    #Hx of UC  -GI consult says outpatient follow up  -No accute flare    #PostPartum  - encourage ambulation  - PO hydration  - Continue diet as tolerated   - Monitor vitals and bleeding  - anticipate d/c home  and is instructed to follow up in 6 weeks.

## 2024-07-16 PROCEDURE — 99238 HOSP IP/OBS DSCHRG MGMT 30/<: CPT

## 2024-07-16 RX ORDER — ACETAMINOPHEN 325 MG
3 TABLET ORAL
Qty: 0 | Refills: 0 | DISCHARGE
Start: 2024-07-16

## 2024-07-16 RX ORDER — NITROFURANTOIN MACROCRYSTAL 100 MG
100 CAPSULE ORAL
Refills: 0 | Status: DISCONTINUED | OUTPATIENT
Start: 2024-07-16 | End: 2024-07-17

## 2024-07-16 RX ORDER — SIMETHICONE 40MG/0.6ML
1 SUSPENSION, DROPS(FINAL DOSAGE FORM)(ML) ORAL
Qty: 0 | Refills: 0 | DISCHARGE
Start: 2024-07-16

## 2024-07-16 RX ORDER — NITROFURANTOIN MACROCRYSTAL 100 MG
1 CAPSULE ORAL
Qty: 0 | Refills: 0 | DISCHARGE
Start: 2024-07-16

## 2024-07-16 RX ORDER — PRENATAL VIT/IRON FUM/FOLIC AC 60 MG-1 MG
1 TABLET ORAL
Qty: 0 | Refills: 0 | DISCHARGE
Start: 2024-07-16

## 2024-07-16 RX ADMIN — Medication 975 MILLIGRAM(S): at 06:28

## 2024-07-16 RX ADMIN — Medication 1 TABLET(S): at 11:57

## 2024-07-16 RX ADMIN — Medication 975 MILLIGRAM(S): at 15:07

## 2024-07-16 RX ADMIN — Medication 975 MILLIGRAM(S): at 08:08

## 2024-07-16 RX ADMIN — OXYCODONE HYDROCHLORIDE 5 MILLIGRAM(S): 100 SOLUTION ORAL at 12:08

## 2024-07-16 RX ADMIN — Medication 3 MILLILITER(S): at 21:29

## 2024-07-16 RX ADMIN — Medication 3 MILLILITER(S): at 14:00

## 2024-07-16 RX ADMIN — Medication 975 MILLIGRAM(S): at 21:10

## 2024-07-16 RX ADMIN — Medication 975 MILLIGRAM(S): at 05:58

## 2024-07-16 RX ADMIN — Medication 100 MILLIGRAM(S): at 15:07

## 2024-07-16 RX ADMIN — Medication 975 MILLIGRAM(S): at 20:10

## 2024-07-16 NOTE — PROGRESS NOTE ADULT - SUBJECTIVE AND OBJECTIVE BOX
PGY1 Note    Patient seen and examined. Pain well controlled at this time. Denies fever, chills, nausea, vomiting, chest pain, shortness of breath, severe abdominal pain, heavy vaginal bleeding. Patient is  Ambulating.   Passing flatus, endorses bowel movement  Diet: Regular, tolerating PO  Voiding: gonzales catheter in place    MEDICATIONS  (STANDING):  acetaminophen     Tablet .. 975 milliGRAM(s) Oral <User Schedule>  diphtheria/tetanus/pertussis (acellular) Vaccine (Adacel) 0.5 milliLiter(s) IntraMuscular once  oxytocin Infusion 41.667 milliUNIT(s)/Min (125 mL/Hr) IV Continuous <Continuous>  prenatal multivitamin 1 Tablet(s) Oral daily  sodium chloride 0.9% lock flush 3 milliLiter(s) IV Push every 8 hours    MEDICATIONS  (PRN):  benzocaine 20%/menthol 0.5% Spray 1 Spray(s) Topical every 6 hours PRN for Perineal discomfort  dibucaine 1% Ointment 1 Application(s) Topical every 6 hours PRN Perineal discomfort  diphenhydrAMINE 25 milliGRAM(s) Oral every 6 hours PRN Pruritus  hydrocortisone 1% Cream 1 Application(s) Topical every 6 hours PRN Moderate Pain (4-6)  lanolin Ointment 1 Application(s) Topical every 6 hours PRN nipple soreness  magnesium hydroxide Suspension 30 milliLiter(s) Oral two times a day PRN Constipation  oxyCODONE    IR 5 milliGRAM(s) Oral every 3 hours PRN Moderate to Severe Pain (4-10)  oxyCODONE    IR 5 milliGRAM(s) Oral once PRN Moderate to Severe Pain (4-10)  pramoxine 1%/zinc 5% Cream 1 Application(s) Topical every 4 hours PRN Moderate Pain (4-6)  simethicone 80 milliGRAM(s) Chew every 4 hours PRN Gas  witch hazel Pads 1 Application(s) Topical every 4 hours PRN Perineal discomfort      Physical Exam  Vital Signs Last 24 Hrs  T(C): 36.8 (2024 03:00), Max: 36.9 (15 Jul 2024 16:15)  T(F): 98.2 (2024 03:00), Max: 98.5 (15 Jul 2024 16:15)  HR: 84 (2024 03:00) (61 - 84)  BP: 107/69 (2024 03:00) (101/66 - 127/87)  RR: 18 (2024 03:00) (18 - 18)  SpO2: 99% (2024 03:00) (97% - 99%)    Parameters below as of 15 Jul 2024 19:34  Patient On (Oxygen Delivery Method): room air      Gen: AAOx3, NAD  Abd: Soft, nontender, nondistended, BS+  Lochia: minimal moderate      Labs:                        11.1   18.81 )-----------( 239      ( 2024 20:28 )             32.3                         13.1   11.17 )-----------( 267      ( 2024 13:24 )             37.6         S/P  PPD  , recovering well  - encourage ambulation  - PO hydration  - Continue diet as tolerated   - Monitor vitals and bleeding  - f/u AM CBC   - Desires birth control  - anticipate d/c home  and is instructed to follow up in 6 weeks.

## 2024-07-16 NOTE — PROGRESS NOTE ADULT - ASSESSMENT
33yo now P1 at PPD#3, H/O UC,  gonzales in place for urinary retention.     Plan:     #Urinary Retention  -active TOV this afternoon  -f/u with UroGyn oupatient   -d/c with VNS    #Hx of UC  -GI consult says outpatient follow up  -No accute flare    #PostPartum  - encourage ambulation  - PO hydration  - Continue diet as tolerated   - Monitor vitals and bleeding  - anticipate d/c home  and is instructed to follow up in 6 weeks.      Discussed with Dr. Vo, Dr. Nazario

## 2024-07-16 NOTE — CHART NOTE - NSCHARTNOTEFT_GEN_A_CORE
PGY-4 TOV Chart Note    Pt eval at bedside for trial of void.  Pain well controlled prior to starting TOV.     Bladder fully drained through transurethral gonzales catheter.  Retrograde filled with 300cc sterile water then clamped.  Gonzales removed.  Patient assisted to restroom.  Given necessary time to void (20 minutes).    Patient voided 210cc of clear urine, passing TOV.      Patient understands we will be monitoring her for additional voids and treating UTI with macrobid 100mg BID.

## 2024-07-17 ENCOUNTER — NON-APPOINTMENT (OUTPATIENT)
Age: 34
End: 2024-07-17

## 2024-07-17 VITALS
OXYGEN SATURATION: 99 % | HEART RATE: 84 BPM | RESPIRATION RATE: 18 BRPM | SYSTOLIC BLOOD PRESSURE: 125 MMHG | DIASTOLIC BLOOD PRESSURE: 84 MMHG | TEMPERATURE: 98 F

## 2024-07-17 RX ORDER — PRENATAL VIT/IRON FUM/FOLIC AC 60 MG-1 MG
1 TABLET ORAL
Qty: 30 | Refills: 0
Start: 2024-07-17 | End: 2024-08-15

## 2024-07-17 RX ORDER — SIMETHICONE 40MG/0.6ML
1 SUSPENSION, DROPS(FINAL DOSAGE FORM)(ML) ORAL
Qty: 180 | Refills: 0
Start: 2024-07-17 | End: 2024-08-15

## 2024-07-17 RX ORDER — NITROFURANTOIN MACROCRYSTAL 100 MG
1 CAPSULE ORAL
Qty: 14 | Refills: 0
Start: 2024-07-17

## 2024-07-17 RX ORDER — ACETAMINOPHEN 325 MG
3 TABLET ORAL
Qty: 42 | Refills: 0
Start: 2024-07-17

## 2024-07-17 RX ADMIN — Medication 1 TABLET(S): at 11:50

## 2024-07-17 RX ADMIN — Medication 975 MILLIGRAM(S): at 03:13

## 2024-07-17 RX ADMIN — Medication 3 MILLILITER(S): at 06:09

## 2024-07-17 RX ADMIN — Medication 975 MILLIGRAM(S): at 09:15

## 2024-07-17 RX ADMIN — Medication 80 MILLIGRAM(S): at 00:13

## 2024-07-17 RX ADMIN — Medication 975 MILLIGRAM(S): at 04:14

## 2024-07-17 RX ADMIN — Medication 100 MILLIGRAM(S): at 04:00

## 2024-07-17 NOTE — PROGRESS NOTE ADULT - ASSESSMENT
33yo  at PPD#4, H/O UC, post partum course complicated by urinary retention. Patient passed active trial of void yesterday () and is voiding appropriately.     Plan:     #Urinary Retention  -patient voiding appropriately  -return precautions discussed: if patient develops severe abdominal pain, or difficulty voiding, she should return for evaluation  -f/u with UroGyn oupatient     #Pelvic Pressure  -patient reports management with tylenol, ibuprofen  -patient advised to return for further evaluation if pain persists or worsens  -may be evaluated at outpatient urogyn visit    #gHTN  -pressures have been normotensive (110s-130s/60s-80s)  -patient declined VNS, bp cuff sent to pharmacy  -f/u in 3 days for bp check    #PostPartum  - encourage ambulation  - PO hydration  - Continue diet as tolerated   - Monitor vitals and bleeding  - D/c home pending pediatric approval    #Hx of UC  -GI consult recommending out-patient follow up  -Per GI, no acute flare    Discussed with Dr. Trotter, Dr. Nazario     33yo  at PPD#4, H/O UC, post partum course complicated by urinary retention. Patient passed active trial of void yesterday () and is voiding appropriately.     Plan:     #Urinary Retention  -patient voiding appropriately  -return precautions discussed: if patient develops severe abdominal pain, or difficulty voiding, she should return for evaluation  -f/u with UroGyn oupatient     #Pelvic Pressure  -patient reports management with tylenol, ibuprofen  -patient advised to return for further evaluation if pain persists or worsens  -may be evaluated at outpatient urogyn visit    #gHTN  -pressures have been normotensive (110s-130s/60s-80s)  -patient declined VNS, bp cuff sent to pharmacy  -f/u in 3 days for bp check    #UTI  -patient started on macrobid 100mg BID  -will send rest of course to pharmacy prior to d/c    #PostPartum  - encourage ambulation  - PO hydration  - Continue diet as tolerated   - Monitor vitals and bleeding  - D/c home pending pediatric approval    #Hx of UC  -GI consult recommending out-patient follow up  -Per GI, no acute flare    Discussed with Dr. Trotter, Dr. Nazario     33yo  at PPD#4, H/O UC, post partum course complicated by urinary retention. Patient passed active trial of void yesterday () and is voiding appropriately.     Plan:     #Urinary Retention  -patient voiding appropriately  -return precautions discussed: if patient develops severe abdominal pain, or difficulty voiding, she should return for evaluation  -f/u with UroGyn oupatient     #Pelvic Pressure  -patient reports management with tylenol, ibuprofen  -patient advised to return for further evaluation if pain persists or worsens  -may be evaluated at outpatient urogyn visit    #gHTN  -pressures have been normotensive (110s-130s/60s-80s)  -patient declined VNS, bp cuff sent to pharmacy  -f/u in 3 days for bp check    #UTI  -patient started on macrobid 100mg BID  -will send rest of course to pharmacy prior to d/c    #PostPartum  - encourage ambulation  - PO hydration  - Continue diet as tolerated   - Monitor vitals and bleeding  - D/c home pending pediatric approval    #Hx of UC  -GI consult recommending out-patient follow up  -Per GI, no acute flare    Discussed with Dr. Nazario

## 2024-07-17 NOTE — PROGRESS NOTE ADULT - ATTENDING COMMENTS
35 y/o PPD#2 s/p , stable, with abdominal pain. Patient is concerned because she states she is in a lot of pain and does not understand why since she had a normal vaginal delivery. She has a history of UC. Discontinued NSAIDS. Will consult GI to rule out possible flare. Otherwise routine postpartum care.
PPD3 s/p   stable  for active trial of void this pm  if pt passes trial, pt cleared for d/c with urogyn follow up
PPD2  no copmlaints  labs/vs stable  RH+  gonzales in place, to remain for 4 days for urinary retention
33 y/o PPD#4 s/p , with urinary retention, now passed active voiding trial. Continue macrobid for UTI. Monitor blood pressures. Anticipate d/c home. Follow up for blood pressure check. Follow up outpatient with urogyn.

## 2024-07-17 NOTE — PROGRESS NOTE ADULT - SUBJECTIVE AND OBJECTIVE BOX
PGY1 Note    Patient seen and examined. Pain well controlled at this time. Patient continues to reports pelvic pressure that is not positional and improves with tylenol and advil. Patient was discharged yesterday after passing active trial of void, but was kept in hospital because her infant was not gaining weight appropriately. Patient denies fever, chills, nausea, vomiting, chest pain, shortness of breath, severe abdominal pain, heavy vaginal bleeding. Patient is ambulating, passing gas, and having regular bowel movements.   Diet: Regular, tolerating PO  Voiding: Voiding without difficulty, no dysuria     MEDICATIONS  (STANDING):  acetaminophen     Tablet .. 975 milliGRAM(s) Oral <User Schedule>  diphtheria/tetanus/pertussis (acellular) Vaccine (Adacel) 0.5 milliLiter(s) IntraMuscular once  nitrofurantoin monohydrate/macrocrystals (MACROBID) 100 milliGRAM(s) Oral two times a day  oxytocin Infusion 41.667 milliUNIT(s)/Min (125 mL/Hr) IV Continuous <Continuous>  prenatal multivitamin 1 Tablet(s) Oral daily  sodium chloride 0.9% lock flush 3 milliLiter(s) IV Push every 8 hours    MEDICATIONS  (PRN):  benzocaine 20%/menthol 0.5% Spray 1 Spray(s) Topical every 6 hours PRN for Perineal discomfort  dibucaine 1% Ointment 1 Application(s) Topical every 6 hours PRN Perineal discomfort  diphenhydrAMINE 25 milliGRAM(s) Oral every 6 hours PRN Pruritus  hydrocortisone 1% Cream 1 Application(s) Topical every 6 hours PRN Moderate Pain (4-6)  lanolin Ointment 1 Application(s) Topical every 6 hours PRN nipple soreness  magnesium hydroxide Suspension 30 milliLiter(s) Oral two times a day PRN Constipation  oxyCODONE    IR 5 milliGRAM(s) Oral every 3 hours PRN Moderate to Severe Pain (4-10)  oxyCODONE    IR 5 milliGRAM(s) Oral once PRN Moderate to Severe Pain (4-10)  pramoxine 1%/zinc 5% Cream 1 Application(s) Topical every 4 hours PRN Moderate Pain (4-6)  simethicone 80 milliGRAM(s) Chew every 4 hours PRN Gas  witch hazel Pads 1 Application(s) Topical every 4 hours PRN Perineal discomfort      Physical Exam  Vital Signs Last 24 Hrs  T(C): 36.8 (17 Jul 2024 03:55), Max: 37.1 (16 Jul 2024 23:28)  T(F): 98.2 (17 Jul 2024 03:55), Max: 98.7 (16 Jul 2024 23:28)  HR: 60 (17 Jul 2024 03:55) (60 - 73)  BP: 128/83 (17 Jul 2024 03:55) (119/68 - 136/82)  RR: 18 (17 Jul 2024 03:55) (18 - 18)  SpO2: 99% (17 Jul 2024 03:55) (97% - 100%)    Parameters below as of 16 Jul 2024 15:40  Patient On (Oxygen Delivery Method): room air      Gen: AAOx3, NAD  Ext: No calf tenderness, no swelling  Fundus: firm, below umbilicus. Nontender.   Abd: Soft, nontender, nondistended, BS+  Lochia: minimal moderate      Labs:                        11.1   18.81 )-----------( 239      ( 13 Jul 2024 20:28 )             32.3                         13.1   11.17 )-----------( 267      ( 12 Jul 2024 13:24 )             37.6         PGY1 Note    Patient seen and examined. Pain well controlled at this time. Patient continues to reports pelvic pressure that is not positional and improves with tylenol and advil. Patient was discharged yesterday after passing active trial of void, but was kept in hospital because her infant was not gaining weight appropriately. Patient denies fever, chills, nausea, vomiting, chest pain, shortness of breath, severe abdominal pain, heavy vaginal bleeding. Patient is ambulating, passing gas, and having regular bowel movements.   Diet: Regular, tolerating PO  Voiding: Voiding without difficulty, no dysuria     MEDICATIONS  (STANDING):  acetaminophen     Tablet .. 975 milliGRAM(s) Oral <User Schedule>  diphtheria/tetanus/pertussis (acellular) Vaccine (Adacel) 0.5 milliLiter(s) IntraMuscular once  nitrofurantoin monohydrate/macrocrystals (MACROBID) 100 milliGRAM(s) Oral two times a day  oxytocin Infusion 41.667 milliUNIT(s)/Min (125 mL/Hr) IV Continuous <Continuous>  prenatal multivitamin 1 Tablet(s) Oral daily  sodium chloride 0.9% lock flush 3 milliLiter(s) IV Push every 8 hours    MEDICATIONS  (PRN):  benzocaine 20%/menthol 0.5% Spray 1 Spray(s) Topical every 6 hours PRN for Perineal discomfort  dibucaine 1% Ointment 1 Application(s) Topical every 6 hours PRN Perineal discomfort  diphenhydrAMINE 25 milliGRAM(s) Oral every 6 hours PRN Pruritus  hydrocortisone 1% Cream 1 Application(s) Topical every 6 hours PRN Moderate Pain (4-6)  lanolin Ointment 1 Application(s) Topical every 6 hours PRN nipple soreness  magnesium hydroxide Suspension 30 milliLiter(s) Oral two times a day PRN Constipation  oxyCODONE    IR 5 milliGRAM(s) Oral every 3 hours PRN Moderate to Severe Pain (4-10)  oxyCODONE    IR 5 milliGRAM(s) Oral once PRN Moderate to Severe Pain (4-10)  pramoxine 1%/zinc 5% Cream 1 Application(s) Topical every 4 hours PRN Moderate Pain (4-6)  simethicone 80 milliGRAM(s) Chew every 4 hours PRN Gas  witch hazel Pads 1 Application(s) Topical every 4 hours PRN Perineal discomfort      Physical Exam  Vital Signs Last 24 Hrs  T(C): 36.8 (17 Jul 2024 03:55), Max: 37.1 (16 Jul 2024 23:28)  T(F): 98.2 (17 Jul 2024 03:55), Max: 98.7 (16 Jul 2024 23:28)  HR: 60 (17 Jul 2024 03:55) (60 - 73)  BP: 128/83 (17 Jul 2024 03:55) (119/68 - 136/82)  RR: 18 (17 Jul 2024 03:55) (18 - 18)  SpO2: 99% (17 Jul 2024 03:55) (97% - 100%)    Parameters below as of 16 Jul 2024 15:40  Patient On (Oxygen Delivery Method): room air      Gen: AAOx3, NAD  Ext: No calf tenderness, no swelling  Fundus: firm, below umbilicus. Nontender.   Abd: Soft, nontender, nondistended, BS+  Lochia: minimal moderate      Labs:                        11.1   18.81 )-----------( 239      ( 13 Jul 2024 20:28 )             32.3                         13.1   11.17 )-----------( 267      ( 12 Jul 2024 13:24 )             37.6      UA significant for small leuk esterase and few bacteria

## 2024-07-18 ENCOUNTER — APPOINTMENT (OUTPATIENT)
Dept: OBGYN | Facility: CLINIC | Age: 34
End: 2024-07-18
Payer: MEDICAID

## 2024-07-18 ENCOUNTER — OUTPATIENT (OUTPATIENT)
Dept: OUTPATIENT SERVICES | Facility: HOSPITAL | Age: 34
LOS: 1 days | End: 2024-07-18
Payer: MEDICAID

## 2024-07-18 VITALS
BODY MASS INDEX: 30.9 KG/M2 | HEIGHT: 64 IN | DIASTOLIC BLOOD PRESSURE: 88 MMHG | SYSTOLIC BLOOD PRESSURE: 142 MMHG | WEIGHT: 181 LBS

## 2024-07-18 VITALS — SYSTOLIC BLOOD PRESSURE: 126 MMHG | DIASTOLIC BLOOD PRESSURE: 84 MMHG

## 2024-07-18 DIAGNOSIS — Z34.90 ENCOUNTER FOR SUPERVISION OF NORMAL PREGNANCY, UNSPECIFIED, UNSPECIFIED TRIMESTER: ICD-10-CM

## 2024-07-18 LAB
-  AMPICILLIN: SIGNIFICANT CHANGE UP
-  CIPROFLOXACIN: SIGNIFICANT CHANGE UP
-  LEVOFLOXACIN: SIGNIFICANT CHANGE UP
-  NITROFURANTOIN: SIGNIFICANT CHANGE UP
-  TETRACYCLINE: SIGNIFICANT CHANGE UP
-  VANCOMYCIN: SIGNIFICANT CHANGE UP
CULTURE RESULTS: ABNORMAL
METHOD TYPE: SIGNIFICANT CHANGE UP
ORGANISM # SPEC MICROSCOPIC CNT: ABNORMAL
ORGANISM # SPEC MICROSCOPIC CNT: SIGNIFICANT CHANGE UP
SPECIMEN SOURCE: SIGNIFICANT CHANGE UP

## 2024-07-18 PROCEDURE — 99213 OFFICE O/P EST LOW 20 MIN: CPT

## 2024-07-22 ENCOUNTER — NON-APPOINTMENT (OUTPATIENT)
Age: 34
End: 2024-07-22

## 2024-07-22 ENCOUNTER — APPOINTMENT (OUTPATIENT)
Dept: ANTEPARTUM | Facility: CLINIC | Age: 34
End: 2024-07-22

## 2024-07-23 DIAGNOSIS — R33.9 RETENTION OF URINE, UNSPECIFIED: ICD-10-CM

## 2024-07-23 DIAGNOSIS — O61.0 FAILED MEDICAL INDUCTION OF LABOR: ICD-10-CM

## 2024-07-23 DIAGNOSIS — Z34.03 ENCOUNTER FOR SUPERVISION OF NORMAL FIRST PREGNANCY, THIRD TRIMESTER: ICD-10-CM

## 2024-07-23 DIAGNOSIS — Z28.09 IMMUNIZATION NOT CARRIED OUT BECAUSE OF OTHER CONTRAINDICATION: ICD-10-CM

## 2024-07-23 DIAGNOSIS — D25.1 INTRAMURAL LEIOMYOMA OF UTERUS: ICD-10-CM

## 2024-07-23 DIAGNOSIS — Z3A.37 37 WEEKS GESTATION OF PREGNANCY: ICD-10-CM

## 2024-07-23 DIAGNOSIS — O34.13 MATERNAL CARE FOR BENIGN TUMOR OF CORPUS UTERI, THIRD TRIMESTER: ICD-10-CM

## 2024-07-25 ENCOUNTER — APPOINTMENT (OUTPATIENT)
Dept: OBGYN | Facility: CLINIC | Age: 34
End: 2024-07-25
Payer: MEDICAID

## 2024-07-25 VITALS
BODY MASS INDEX: 30.05 KG/M2 | HEIGHT: 64 IN | WEIGHT: 176 LBS | SYSTOLIC BLOOD PRESSURE: 124 MMHG | DIASTOLIC BLOOD PRESSURE: 82 MMHG

## 2024-07-25 PROBLEM — K51.90 ULCERATIVE COLITIS, UNSPECIFIED, WITHOUT COMPLICATIONS: Chronic | Status: ACTIVE | Noted: 2024-07-12

## 2024-07-25 PROCEDURE — 99213 OFFICE O/P EST LOW 20 MIN: CPT

## 2024-07-25 RX ORDER — PRENATAL VIT NO.130/IRON/FOLIC 27MG-0.8MG
27-0.8 TABLET ORAL DAILY
Qty: 90 | Refills: 0 | Status: ACTIVE | COMMUNITY
Start: 2024-07-25 | End: 1900-01-01

## 2024-07-25 NOTE — HISTORY OF PRESENT ILLNESS
[Postpartum Follow Up] : postpartum follow up [Delivery Date: ___] : on [unfilled] [] : delivered by vaginal delivery [Male] : Delivery History: baby boy [Wt. ___] : weighing [unfilled] [Breastfeeding] : currently nursing [Back to Normal] : is back to normal in size [None] : no vaginal bleeding [Normal] : the vagina was normal [Healing Well] : is healing well [Doing Well] : is doing well [No Sign of Infection] : is showing no signs of infection [Complications:___] : no complications [Hematoma] : no vaginal hematoma [Abscess Formation] : no vaginal abscess [Infected] : did not appear infected [Dehiscence] : was not dehisced [[id number]] : Pt here for 1 week follow up. Pt states that her hip pain has improved. Still pelvic pressure when she urinates. [de-identified] : pelvic pressure mostl likely normal post partum healing, urine culture ordered, pnv sent to pharmacy, rv in 4 weeks for post partum visit

## 2024-07-27 LAB — BACTERIA UR CULT: NORMAL

## 2024-07-29 ENCOUNTER — APPOINTMENT (OUTPATIENT)
Dept: ANTEPARTUM | Facility: CLINIC | Age: 34
End: 2024-07-29

## 2024-08-02 ENCOUNTER — OUTPATIENT (OUTPATIENT)
Dept: OUTPATIENT SERVICES | Facility: HOSPITAL | Age: 34
LOS: 1 days | End: 2024-08-02
Payer: MEDICAID

## 2024-08-02 ENCOUNTER — APPOINTMENT (OUTPATIENT)
Dept: OBGYN | Facility: CLINIC | Age: 34
End: 2024-08-02

## 2024-08-02 VITALS — BODY MASS INDEX: 29.87 KG/M2 | SYSTOLIC BLOOD PRESSURE: 120 MMHG | DIASTOLIC BLOOD PRESSURE: 85 MMHG | WEIGHT: 174 LBS

## 2024-08-02 DIAGNOSIS — Z34.90 ENCOUNTER FOR SUPERVISION OF NORMAL PREGNANCY, UNSPECIFIED, UNSPECIFIED TRIMESTER: ICD-10-CM

## 2024-08-02 DIAGNOSIS — Z00.00 ENCOUNTER FOR GENERAL ADULT MEDICAL EXAMINATION W/OUT ABNORMAL FINDINGS: ICD-10-CM

## 2024-08-02 DIAGNOSIS — Z98.891 HISTORY OF UTERINE SCAR FROM PREVIOUS SURGERY: Chronic | ICD-10-CM

## 2024-08-02 PROCEDURE — 99214 OFFICE O/P EST MOD 30 MIN: CPT

## 2024-08-06 NOTE — HISTORY OF PRESENT ILLNESS
[Postpartum Follow Up] : postpartum follow up [Delivery Date: ___] : on [unfilled] [] : delivered by vaginal delivery [Male] : Delivery History: baby boy [Breastfeeding] : currently nursing [Back to Normal] : is back to normal in size [Not Done] : Examination of breasts not done [Doing Well] : is doing well [de-identified] : joint pain and itchy palms, headaches with shooting pain down left side of jaw approx. 2-3/day with elevated Bps of 140/90s at that time [de-identified] : 33yo P1, s/p , with gHTN and possible UC flare, recovering well.   [de-identified] : -Cardiology fu  -Neurology f/u for headaches -GI referral -RTC for regular pp visit

## 2024-08-06 NOTE — HISTORY OF PRESENT ILLNESS
[Postpartum Follow Up] : postpartum follow up [Delivery Date: ___] : on [unfilled] [] : delivered by vaginal delivery [Male] : Delivery History: baby boy [Breastfeeding] : currently nursing [Back to Normal] : is back to normal in size [Not Done] : Examination of breasts not done [Doing Well] : is doing well [de-identified] : joint pain and itchy palms, headaches with shooting pain down left side of jaw approx. 2-3/day with elevated Bps of 140/90s at that time [de-identified] : 33yo P1, s/p , with gHTN and possible UC flare, recovering well.   [de-identified] : -Cardiology fu  -Neurology f/u for headaches -GI referral -RTC for regular pp visit

## 2024-08-22 ENCOUNTER — APPOINTMENT (OUTPATIENT)
Dept: OBGYN | Facility: CLINIC | Age: 34
End: 2024-08-22

## 2024-09-05 NOTE — OB PROVIDER H&P - NSPRENATALCARE_OBGYN_ALL_OB
Called pt to discuss.     Pt called her OB and tried to schedule D&C before surgery but they could not get her in. Told her no big deal we will just increase her iron and they can plan for a D&C after surgery or she might not even need one.     Plan  Start iron infusions at [facility]. I already sent order  Begin iron twice daily until you start infusions  Repeat iron panel in 3mths (most likely will be around her 2mth PO appt from [facility] [place])   We will also get preop labs to reassess her blood   counts then   Yes

## 2024-09-11 ENCOUNTER — APPOINTMENT (OUTPATIENT)
Dept: OBGYN | Facility: CLINIC | Age: 34
End: 2024-09-11

## 2025-02-20 DIAGNOSIS — Z71.3 DIETARY COUNSELING AND SURVEILLANCE: ICD-10-CM
